# Patient Record
Sex: FEMALE | Race: WHITE | URBAN - METROPOLITAN AREA
[De-identification: names, ages, dates, MRNs, and addresses within clinical notes are randomized per-mention and may not be internally consistent; named-entity substitution may affect disease eponyms.]

---

## 2023-08-09 ENCOUNTER — APPOINTMENT (OUTPATIENT)
Dept: CT IMAGING | Facility: HOSPITAL | Age: 73
End: 2023-08-09
Attending: STUDENT IN AN ORGANIZED HEALTH CARE EDUCATION/TRAINING PROGRAM
Payer: COMMERCIAL

## 2023-08-09 ENCOUNTER — HOSPITAL ENCOUNTER (INPATIENT)
Facility: HOSPITAL | Age: 73
LOS: 1 days | Discharge: HOME HEALTH | End: 2023-08-11
Attending: STUDENT IN AN ORGANIZED HEALTH CARE EDUCATION/TRAINING PROGRAM | Admitting: INTERNAL MEDICINE
Payer: COMMERCIAL

## 2023-08-09 ENCOUNTER — APPOINTMENT (OUTPATIENT)
Dept: GENERAL RADIOLOGY | Facility: HOSPITAL | Age: 73
End: 2023-08-09
Attending: STUDENT IN AN ORGANIZED HEALTH CARE EDUCATION/TRAINING PROGRAM
Payer: COMMERCIAL

## 2023-08-09 DIAGNOSIS — I63.9 ACUTE CVA (CEREBROVASCULAR ACCIDENT) (HCC): Primary | ICD-10-CM

## 2023-08-09 LAB
ALBUMIN SERPL-MCNC: 4.2 G/DL (ref 3.4–5)
ALBUMIN/GLOB SERPL: 1.1 {RATIO} (ref 1–2)
ALP LIVER SERPL-CCNC: 80 U/L
ALT SERPL-CCNC: 20 U/L
ANION GAP SERPL CALC-SCNC: 3 MMOL/L (ref 0–18)
AST SERPL-CCNC: 14 U/L (ref 15–37)
BASOPHILS # BLD AUTO: 0.05 X10(3) UL (ref 0–0.2)
BASOPHILS NFR BLD AUTO: 0.7 %
BILIRUB SERPL-MCNC: 0.5 MG/DL (ref 0.1–2)
BUN BLD-MCNC: 33 MG/DL (ref 7–18)
CALCIUM BLD-MCNC: 9.8 MG/DL (ref 8.5–10.1)
CHLORIDE SERPL-SCNC: 108 MMOL/L (ref 98–112)
CO2 SERPL-SCNC: 27 MMOL/L (ref 21–32)
CREAT BLD-MCNC: 1.09 MG/DL
EGFRCR SERPLBLD CKD-EPI 2021: 54 ML/MIN/1.73M2 (ref 60–?)
EOSINOPHIL # BLD AUTO: 0.21 X10(3) UL (ref 0–0.7)
EOSINOPHIL NFR BLD AUTO: 2.9 %
ERYTHROCYTE [DISTWIDTH] IN BLOOD BY AUTOMATED COUNT: 11.9 %
GLOBULIN PLAS-MCNC: 3.7 G/DL (ref 2.8–4.4)
GLUCOSE BLD-MCNC: 124 MG/DL (ref 70–99)
HCT VFR BLD AUTO: 35 %
HGB BLD-MCNC: 11.7 G/DL
IMM GRANULOCYTES # BLD AUTO: 0.03 X10(3) UL (ref 0–1)
IMM GRANULOCYTES NFR BLD: 0.4 %
LYMPHOCYTES # BLD AUTO: 1.75 X10(3) UL (ref 1–4)
LYMPHOCYTES NFR BLD AUTO: 24.4 %
MCH RBC QN AUTO: 31.7 PG (ref 26–34)
MCHC RBC AUTO-ENTMCNC: 33.4 G/DL (ref 31–37)
MCV RBC AUTO: 94.9 FL
MONOCYTES # BLD AUTO: 0.56 X10(3) UL (ref 0.1–1)
MONOCYTES NFR BLD AUTO: 7.8 %
NEUTROPHILS # BLD AUTO: 4.56 X10 (3) UL (ref 1.5–7.7)
NEUTROPHILS # BLD AUTO: 4.56 X10(3) UL (ref 1.5–7.7)
NEUTROPHILS NFR BLD AUTO: 63.8 %
OSMOLALITY SERPL CALC.SUM OF ELEC: 295 MOSM/KG (ref 275–295)
PLATELET # BLD AUTO: 314 10(3)UL (ref 150–450)
POTASSIUM SERPL-SCNC: 3.8 MMOL/L (ref 3.5–5.1)
PROT SERPL-MCNC: 7.9 G/DL (ref 6.4–8.2)
RBC # BLD AUTO: 3.69 X10(6)UL
SODIUM SERPL-SCNC: 138 MMOL/L (ref 136–145)
TROPONIN I HIGH SENSITIVITY: 17 NG/L
WBC # BLD AUTO: 7.2 X10(3) UL (ref 4–11)

## 2023-08-09 PROCEDURE — 71045 X-RAY EXAM CHEST 1 VIEW: CPT | Performed by: STUDENT IN AN ORGANIZED HEALTH CARE EDUCATION/TRAINING PROGRAM

## 2023-08-09 PROCEDURE — 70450 CT HEAD/BRAIN W/O DYE: CPT | Performed by: STUDENT IN AN ORGANIZED HEALTH CARE EDUCATION/TRAINING PROGRAM

## 2023-08-10 ENCOUNTER — APPOINTMENT (OUTPATIENT)
Dept: CV DIAGNOSTICS | Facility: HOSPITAL | Age: 73
End: 2023-08-10
Attending: INTERNAL MEDICINE
Payer: COMMERCIAL

## 2023-08-10 ENCOUNTER — APPOINTMENT (OUTPATIENT)
Dept: MRI IMAGING | Facility: HOSPITAL | Age: 73
End: 2023-08-10
Attending: STUDENT IN AN ORGANIZED HEALTH CARE EDUCATION/TRAINING PROGRAM
Payer: COMMERCIAL

## 2023-08-10 PROBLEM — I63.9 ACUTE CVA (CEREBROVASCULAR ACCIDENT) (HCC): Status: ACTIVE | Noted: 2023-08-10

## 2023-08-10 PROBLEM — R73.03 PREDIABETES: Status: ACTIVE | Noted: 2023-08-10

## 2023-08-10 PROBLEM — E78.5 HYPERLIPIDEMIA: Status: ACTIVE | Noted: 2023-08-10

## 2023-08-10 LAB
ATRIAL RATE: 58 BPM
CHOLEST SERPL-MCNC: 156 MG/DL (ref ?–200)
EST. AVERAGE GLUCOSE BLD GHB EST-MCNC: 123 MG/DL (ref 68–126)
GLUCOSE BLD-MCNC: 101 MG/DL (ref 70–99)
GLUCOSE BLD-MCNC: 107 MG/DL (ref 70–99)
GLUCOSE BLD-MCNC: 107 MG/DL (ref 70–99)
GLUCOSE BLD-MCNC: 88 MG/DL (ref 70–99)
HBA1C MFR BLD: 5.9 % (ref ?–5.7)
HDLC SERPL-MCNC: 67 MG/DL (ref 40–59)
LDLC SERPL CALC-MCNC: 71 MG/DL (ref ?–100)
NONHDLC SERPL-MCNC: 89 MG/DL (ref ?–130)
P AXIS: 11 DEGREES
P-R INTERVAL: 182 MS
Q-T INTERVAL: 412 MS
QRS DURATION: 80 MS
QTC CALCULATION (BEZET): 404 MS
R AXIS: -10 DEGREES
T AXIS: 59 DEGREES
TRIGL SERPL-MCNC: 97 MG/DL (ref 30–149)
TROPONIN I HIGH SENSITIVITY: 19 NG/L
VENTRICULAR RATE: 58 BPM
VLDLC SERPL CALC-MCNC: 15 MG/DL (ref 0–30)

## 2023-08-10 PROCEDURE — 99223 1ST HOSP IP/OBS HIGH 75: CPT | Performed by: INTERNAL MEDICINE

## 2023-08-10 PROCEDURE — 70551 MRI BRAIN STEM W/O DYE: CPT | Performed by: STUDENT IN AN ORGANIZED HEALTH CARE EDUCATION/TRAINING PROGRAM

## 2023-08-10 PROCEDURE — 93306 TTE W/DOPPLER COMPLETE: CPT | Performed by: INTERNAL MEDICINE

## 2023-08-10 RX ORDER — ASPIRIN 325 MG
325 TABLET ORAL DAILY
Status: DISCONTINUED | OUTPATIENT
Start: 2023-08-10 | End: 2023-08-11

## 2023-08-10 RX ORDER — ATORVASTATIN CALCIUM 40 MG/1
40 TABLET, FILM COATED ORAL NIGHTLY
Status: DISCONTINUED | OUTPATIENT
Start: 2023-08-10 | End: 2023-08-11

## 2023-08-10 RX ORDER — DEXTROSE MONOHYDRATE 25 G/50ML
50 INJECTION, SOLUTION INTRAVENOUS
Status: DISCONTINUED | OUTPATIENT
Start: 2023-08-10 | End: 2023-08-11

## 2023-08-10 RX ORDER — HYDRALAZINE HYDROCHLORIDE 20 MG/ML
10 INJECTION INTRAMUSCULAR; INTRAVENOUS EVERY 2 HOUR PRN
Status: DISCONTINUED | OUTPATIENT
Start: 2023-08-10 | End: 2023-08-11

## 2023-08-10 RX ORDER — ACETAMINOPHEN 325 MG/1
650 TABLET ORAL EVERY 4 HOURS PRN
Status: DISCONTINUED | OUTPATIENT
Start: 2023-08-10 | End: 2023-08-11

## 2023-08-10 RX ORDER — LEVOTHYROXINE SODIUM 0.03 MG/1
25 TABLET ORAL
Status: DISCONTINUED | OUTPATIENT
Start: 2023-08-10 | End: 2023-08-11

## 2023-08-10 RX ORDER — LABETALOL HYDROCHLORIDE 5 MG/ML
10 INJECTION, SOLUTION INTRAVENOUS EVERY 10 MIN PRN
Status: DISCONTINUED | OUTPATIENT
Start: 2023-08-10 | End: 2023-08-11

## 2023-08-10 RX ORDER — LEVOTHYROXINE SODIUM 0.03 MG/1
25 TABLET ORAL
COMMUNITY

## 2023-08-10 RX ORDER — ENOXAPARIN SODIUM 100 MG/ML
40 INJECTION SUBCUTANEOUS DAILY
Status: DISCONTINUED | OUTPATIENT
Start: 2023-08-10 | End: 2023-08-11

## 2023-08-10 RX ORDER — GABAPENTIN 300 MG/1
300 CAPSULE ORAL NIGHTLY
Status: DISCONTINUED | OUTPATIENT
Start: 2023-08-10 | End: 2023-08-11

## 2023-08-10 RX ORDER — METOCLOPRAMIDE HYDROCHLORIDE 5 MG/ML
10 INJECTION INTRAMUSCULAR; INTRAVENOUS EVERY 8 HOURS PRN
Status: DISCONTINUED | OUTPATIENT
Start: 2023-08-10 | End: 2023-08-11

## 2023-08-10 RX ORDER — VALSARTAN AND HYDROCHLOROTHIAZIDE 80; 12.5 MG/1; MG/1
1 TABLET, FILM COATED ORAL DAILY
COMMUNITY

## 2023-08-10 RX ORDER — NAPROXEN 500 MG/1
500 TABLET ORAL 2 TIMES DAILY WITH MEALS
COMMUNITY
End: 2023-08-11

## 2023-08-10 RX ORDER — NICOTINE POLACRILEX 4 MG
30 LOZENGE BUCCAL
Status: DISCONTINUED | OUTPATIENT
Start: 2023-08-10 | End: 2023-08-11

## 2023-08-10 RX ORDER — ACETAMINOPHEN 650 MG/1
650 SUPPOSITORY RECTAL EVERY 4 HOURS PRN
Status: DISCONTINUED | OUTPATIENT
Start: 2023-08-10 | End: 2023-08-11

## 2023-08-10 RX ORDER — ONDANSETRON 2 MG/ML
4 INJECTION INTRAMUSCULAR; INTRAVENOUS EVERY 6 HOURS PRN
Status: DISCONTINUED | OUTPATIENT
Start: 2023-08-10 | End: 2023-08-11

## 2023-08-10 RX ORDER — NICOTINE POLACRILEX 4 MG
15 LOZENGE BUCCAL
Status: DISCONTINUED | OUTPATIENT
Start: 2023-08-10 | End: 2023-08-11

## 2023-08-10 RX ORDER — POTASSIUM CHLORIDE 20 MEQ/1
40 TABLET, EXTENDED RELEASE ORAL ONCE
Status: COMPLETED | OUTPATIENT
Start: 2023-08-10 | End: 2023-08-10

## 2023-08-10 RX ORDER — SODIUM CHLORIDE 9 MG/ML
INJECTION, SOLUTION INTRAVENOUS CONTINUOUS
Status: DISCONTINUED | OUTPATIENT
Start: 2023-08-10 | End: 2023-08-11

## 2023-08-10 RX ORDER — ASPIRIN 300 MG/1
300 SUPPOSITORY RECTAL DAILY
Status: DISCONTINUED | OUTPATIENT
Start: 2023-08-10 | End: 2023-08-11

## 2023-08-10 NOTE — PROGRESS NOTES
Assumed care 1700  Patient alert and oriented X4, [place] speaking  On room air, VSS, NSR/SB on tele   Q4 neuro, no new deficits   Up with SBA and cane  Updated on plan of care

## 2023-08-10 NOTE — ED INITIAL ASSESSMENT (HPI)
Pt family reports pt having R sided weakness since 7/30. Seen by PCP today, advised to come for Ataxia and R/O CVA. Pt primary language, [place]. Son here with pt.      No facial droop, slight weakness to the R arm and R leg, unable to assess speech due to language barrier

## 2023-08-10 NOTE — SLP NOTE
ADULT SWALLOWING EVALUATION    ASSESSMENT    ASSESSMENT/OVERALL IMPRESSION:  Order received for bedside swallow evaluation to r/o aspiration per CVA protocol. Pt presented to THE [facility] ER from primary physician's office for CVA workup. Pt with c/o on-going HA, R hemiparesis and gait instability. MRI pending. Pmhx includes CVA, HTN, CAD w/stenting, HLD and DM. Pt's primary language is [place]. Pt passed RN dysphagia screening and placed on a regular consistency diet with thin liquids. Pt found lying down in bed; alert & participatory; able to follow all commands and participate in conversation appropriately with utilization of [facility] . Pt reported intermittent throat pain but denied any significant hx of dysphagia. She reported good tolerance with regular consistency diet and thin liquids prior to and currently while admitted. Oral mech/motor exam revealed patient with upper and lower partial dentures. Oral cavity appeared clean & moist. No significant oral motor deficits discovered. Clear vocal quality, volitional swallow and productive cough elicited. Head of bed elevated to 90 degrees and pt observed feeding herself po trials of thin via cup & straw, pureed and cracker consistencies. Adequate retrieval & containment with timely mastication & transit; no observed oral residue. Pharyngeal response appeared timely with hyolaryngeal elevation palpated and clear vocal quality following all po trials. No overt clinical s/s of aspiration noted or reported by patient or staff. Swallow mechanism appears to be grossly intact with no overt clinical s/s of aspiration noted or reported. Following exam, discussed results, aspiration risks, diet recommendations, aspiration precautions and plan with patient. Answered pt's questions to her apparent satisfaction with good understanding reported residual deficits in communication from previous CVA in 2015 with some increased difficulty with \"words dragging. \" Will continue to follow to assess tolerance with recommended diet and complete cog-communicative exam pending neuro workup. Shrestha Assessment of Swallow Function Score: No abnormality detected    RECOMMENDATIONS   Diet Recommendations - Solids: Regular  Diet Recommendations - Liquids: Thin Liquids                        Compensatory Strategies Recommended: Slow rate; Alternate consistencies;Small bites and sips  Aspiration Precautions: Upright position; Slow rate;Small bites and sips  Medication Administration Recommendations: No restrictions  Treatment Plan/Recommendations: Dysphagia therapy; Aspiration precautions (communication evaluation pending neuro workup)  Discharge Recommendations/Plan: Undetermined    HISTORY   MEDICAL HISTORY  Reason for Referral: Stroke protocol    Problem List  Principal Problem:    Acute CVA (cerebrovascular accident) [facility])  Active Problems:    Essential hypertension    Hyperlipidemia    Prediabetes      Past Medical History  Past Medical History:   Diagnosis Date    Atherosclerosis of coronary artery     CVA (cerebral vascular accident) ([facility] [address] 2015    Diabetes ([facility] [address]     Hyperlipidemia     Unspecified essential hypertension        Prior Living Situation: Home with support (lives with sons)  Diet Prior to Admission: Regular; Thin liquids       Patient/Family Goals: return home    SWALLOWING HISTORY  Current Diet Consistency: Regular; Thin liquids  Dysphagia History: denied  Imaging Results: CXR 8/9/23  Unremarkable     SUBJECTIVE       OBJECTIVE   ORAL MOTOR EXAMINATION  Dentition: Upper partials; Lower partials  Symmetry: Within Functional Limits  Strength: Within Functional Limits  Tone: Within Functional Limits  Range of Motion: Within Functional Limits  Rate of Motion: Within Functional Limits    Voice Quality: Clear  Respiratory Status: Unlabored  Consistencies Trialed: Thin liquids;Puree;Hard solid  Method of Presentation: Self presentation;Spoon;Cup;Straw;Single sips; Consecutive swallows  Patient Positioning: Upright;Midline (pt lying down in bed; HOB elevated to 90 degrees)    Oral Phase of Swallow: Within Functional Limits                      Pharyngeal Phase of Swallow:  (appears to be [facility] - no overt clinical s/s of aspiration)           (Please note: Silent aspiration cannot be evaluated clinically. Videofluoroscopic Swallow Study is required to rule-out silent aspiration.)    Esophageal Phase of Swallow: No complaints consistent with possible esophageal involvement  Comments:               GOALS  Goal #1 The patient will tolerate regular consistency and thin liquids without overt signs or symptoms of aspiration with 95 % accuracy over 2 session(s). In Progress   Goal #2 The patient/family/caregiver will demonstrate understanding and implementation of aspiration precautions and swallow strategies independently over 2 session(s). In progress   Goal #3 The patient will utilize compensatory strategies as outlined by  BSSE (clinical evaluation) including Slow rate, Small bites, Small sips, Alternate liquids/solids, Upright 90 degrees with as needed assistance 100 % of the time across 2 sessions. In progress                              FOLLOW UP  Treatment Plan/Recommendations: Dysphagia therapy; Aspiration precautions (communication evaluation pending neuro workup)  Number of Visits to Meet Established Goals: 2  Follow Up Needed (Documentation Required): Yes  SLP Follow-up Date: 08/11/23    Thank you for your referral.   If you have any questions, please contact Bart Rios, [place], [address] [facility]  Pager

## 2023-08-10 NOTE — PHYSICAL THERAPY NOTE
PHYSICAL THERAPY EVALUATION - INPATIENT     Room Number: 4038/0103-V  Evaluation Date: 8/10/2023  Type of Evaluation: Initial  Physician Order: PT Eval and Treat    Presenting Problem: R side weakness with ataxia  Co-Morbidities : Hx CVA, HTN, CAD s/p stenting, DM2  Reason for Therapy: Mobility Dysfunction and Discharge Planning    CT BRAIN:  No evidence of intracranial hemorrhage or extra-axial fluid collection. Lucencies in the deep periventricular white matter are likely sequelae of chronic small vessel ischemic disease. Prominence of the sulci. No mass effect. Visualized portions of paranasal sinuses are unremarkable. Visualized portions of the mastoid air cells are unremarkable. Visualized portions of the orbits are unremarkable. IMPRESSION:   Mild global parenchymal volume loss. Sequelae of chronic small vessel ischemic disease is noted. No evidence of intracranial hemorrhage or extra-axial fluid collection. MRI BRAIN: There is no evidence of acute infarct. ASSESSMENT   Pt is a 68year old female admitted on 8/9/2023 for R side weakness with gait abnormalities and ataxia- imaging with no acute findings. Functional outcome measures completed include [facility]. The AM-PAC '6-Clicks' Inpatient Basic Mobility Short Form was completed and this patient is demonstrating a Approx Degree of Impairment: 41.77%  degree of impairment in mobility. Research supports that patients with this level of impairment may benefit from HOME w/ int supervision. PT Discharge Recommendations: Home; Intermittent Supervision      PLAN  Patient has been evaluated and presents with no skilled Physical Therapy needs at this time. Patient discharged from Physical Therapy services. Please re-order if a new functional limitation presents during this admission. GOALS  Patient was able to achieve the following goals . ..     Patient was able to transfer At previous, functional level  Safely and independently   Patient able to ambulate on level surfaces At previous, functional level  Safely and independently         HOME SITUATION  Type of Home: House   Home Layout: Two level  Stairs to Enter : 2     Stairs to Bedroom: 13  Railing: Yes    Lives With: Son;Family  Drives: No  Patient Owned Equipment: Cane  Patient Regularly Uses: Glasses    Prior Level of [place]:  used as pt is [place] speaking- typically [place] with mobility tasks. Ambulates with cane. Lives in two story home with son and his family. Daughter in law assists with IADLs. Denies history of falls. She states she had an \"episode\" on July 30th, where she was unable to walk for 3 days and had significant weakness in RUE/RLE, but she notes a lot of it has seemed to resolved. Has chronic R knee pain as well. Does make note that she only does the stairs once a day; stays on main level throughout the day and only goes up to go to bed. SUBJECTIVE  \"SUPER!:      OBJECTIVE  Precautions: Bed/chair alarm;  needed ([place] speaking)  Fall Risk: High fall risk    WEIGHT BEARING RESTRICTION  Weight Bearing Restriction: None                PAIN ASSESSMENT  Rating: Unable to rate  Location: r knee  Management Techniques: Activity promotion; Body mechanics;Repositioning    COGNITION  Overall Cognitive Status:  WFL - within functional limits    RANGE OF MOTION AND STRENGTH ASSESSMENT  Upper extremity ROM and strength are within functional limits     Lower extremity ROM is within functional limits     Lower extremity strength is within functional limits : unable to fully test R LE due to pain in the knee- observed pt preferring to ambulate with little to no knee flexion       BALANCE  Static Sitting: Good  Dynamic Sitting: Fair +  Static Standing: Fair  Dynamic Standing: Fair -    ADDITIONAL TESTS                 Modified [place]: 1                  ACTIVITY TOLERANCE                         O2 WALK       NEUROLOGICAL FINDINGS                        AM-PAC '6-Clicks' INPATIENT SHORT FORM - BASIC MOBILITY  How much difficulty does the patient currently have. .. Patient Difficulty: Turning over in bed (including adjusting bedclothes, sheets and blankets)?: None   Patient Difficulty: Sitting down on and standing up from a chair with arms (e.g., wheelchair, bedside commode, etc.): A Little   Patient Difficulty: Moving from lying on back to sitting on the side of the bed?: A Little   How much help from another person does the patient currently need. .. Help from Another: Moving to and from a bed to a chair (including a wheelchair)?: A Little   Help from Another: Need to walk in hospital room?: A Little   Help from Another: Climbing 3-5 steps with a railing?: A Little       AM-PAC Score:  Raw Score: 19   Approx Degree of Impairment: 41.77%   Standardized Score (AM-PAC Scale): 45.44   CMS Modifier (G-Code): CK    FUNCTIONAL ABILITY STATUS  Gait Assessment   Functional Mobility/Gait Assessment  Gait Assistance: Contact guard assist  Distance (ft): 100  Assistive Device: Cane  Pattern:  (antalgic)    Skilled Therapy Provided     Bed Mobility:  Rolling: ind  Supine to sit: ind   Sit to supine: ind     Transfer Mobility:  Sit to stand: supervision to cane   Stand to sit: supervision  Gait = CGA/supervision with cane. Antalgic gait pattern due to chronic R knee pain. Prefers to walk with RLE in extension and circumduction for clearance. Therapist's comments:Patient presents in bed.  used as pt is [place] speaking. Pt completed bed mobility independently. Once sitting EOB, pt reporting dizziness- BP within parameters. Coordination observed to be intact. No significant strength deficits noted; unable to fully assess RLE as pt yelping out in pain/discomfort when attempted to apply any over pressure. Sit to stand with cane at SBA. Ambulated with cane at CGA/ SBA, note gait pattern above.  Educated pt on importance of safety with walking and slowing ambulation down, possibly recommend use of RW for inr safety and stability, but pt wanting to use cane. Pt returned supine in bed independently at end of session, as echo tech present to perform test. Pt seems to be functioning at her baseline and does not require any further IP PT interventions. Upon discharge, recommendation to home with no therapy needs. She has good family support. RN, PCT, and SW aware. Exercise/Education Provided:  Bed mobility  Body mechanics  Energy conservation  Functional activity tolerated  Gait training  Transfer training    Patient End of Session: In bed;Needs met;Call light within reach;RN aware of session/findings; All patient questions and concerns addressed; Alarm set; Discussed recommendations with /    Patient Evaluation Complexity Level:  History Moderate - 1 or 2 personal factors and/or co-morbidities   Examination of body systems Low - addressing 1-2 elements   Clinical Presentation Low - Stable   Clinical Decision Making Low Complexity       PT Session Time: 17 minutes  Gait Training: 10 minutes  Therapeutic Activity: minutes  Neuromuscular Re-education: minutes  Therapeutic Exercise:  minutes

## 2023-08-10 NOTE — PROGRESS NOTES
Assumed care of pt at 0730. AxO x4, primarily [place] speaking, utilized language line for all patient interactions. Neuro checks q4 hrs, right sided weakness with improving dizziness and diminished right sided sensation throughout the shift, see flowsheets. C/o right leg pain, usually controlled with gabapentin at home per patient, gabapentin ordered to start tonight, currently tolerable with tylenol. RA. NSR/SB on tele, lovenox. MRI/MRA ordered, screening form completed. Voids. Appetite okay, some nausea this afternoon, zofran with relief. Up with SBA with cane, bed alarm active. POC reviewed, call light within reach.

## 2023-08-10 NOTE — PLAN OF CARE
NURSING ADMISSION NOTE      Patient admitted via Cart to 7621  Oriented to room. Safety precautions initiated. Bed in low position. Call light in reach. Received pt at 0300  Pt AOx4, NSR/SB, RA, VSS  Pt is [place] speaking only.  used to converse with pt. Still noncompliant with fall precautions   Admission navigator completed w/ son, Bienvenido Carson. Neuro checks. See flowsheets  IVF  D/c Planning: Neuro, PT/OT to see. MRI/MRA head/ neck, echo  Call light within reach.   Needs currently met

## 2023-08-10 NOTE — ED QUICK NOTES
Orders for admission, patient is aware of plan and ready to go upstairs. Any questions, please call ED RN Bianka at extension [phone]. Patient Covid vaccination status: Unvaccinated     COVID Test Ordered in ED: None    COVID Suspicion at Admission: N/A    Running Infusions:  None    Mental Status/LOC at time of transport: A&Ox4    Other pertinent information: Patient is [place] speaking only. Son has went home form the night. Pt was using purewick as precaution but is ambulatory.   CIWA score: N/A   NIH score:  4

## 2023-08-11 ENCOUNTER — APPOINTMENT (OUTPATIENT)
Dept: MRI IMAGING | Facility: HOSPITAL | Age: 73
End: 2023-08-11
Attending: INTERNAL MEDICINE
Payer: COMMERCIAL

## 2023-08-11 VITALS
DIASTOLIC BLOOD PRESSURE: 47 MMHG | HEART RATE: 61 BPM | WEIGHT: 165 LBS | TEMPERATURE: 97 F | RESPIRATION RATE: 17 BRPM | BODY MASS INDEX: 35.11 KG/M2 | HEIGHT: 57.48 IN | SYSTOLIC BLOOD PRESSURE: 116 MMHG | OXYGEN SATURATION: 97 %

## 2023-08-11 LAB
ANION GAP SERPL CALC-SCNC: 3 MMOL/L (ref 0–18)
BASOPHILS # BLD AUTO: 0.07 X10(3) UL (ref 0–0.2)
BASOPHILS NFR BLD AUTO: 1.3 %
BUN BLD-MCNC: 24 MG/DL (ref 7–18)
CALCIUM BLD-MCNC: 9.1 MG/DL (ref 8.5–10.1)
CHLORIDE SERPL-SCNC: 113 MMOL/L (ref 98–112)
CO2 SERPL-SCNC: 23 MMOL/L (ref 21–32)
CREAT BLD-MCNC: 1.07 MG/DL
EGFRCR SERPLBLD CKD-EPI 2021: 55 ML/MIN/1.73M2 (ref 60–?)
EOSINOPHIL # BLD AUTO: 0.41 X10(3) UL (ref 0–0.7)
EOSINOPHIL NFR BLD AUTO: 7.5 %
ERYTHROCYTE [DISTWIDTH] IN BLOOD BY AUTOMATED COUNT: 12.1 %
GLUCOSE BLD-MCNC: 104 MG/DL (ref 70–99)
GLUCOSE BLD-MCNC: 85 MG/DL (ref 70–99)
GLUCOSE BLD-MCNC: 87 MG/DL (ref 70–99)
GLUCOSE BLD-MCNC: 99 MG/DL (ref 70–99)
HCT VFR BLD AUTO: 32.6 %
HGB BLD-MCNC: 10.2 G/DL
IMM GRANULOCYTES # BLD AUTO: 0.01 X10(3) UL (ref 0–1)
IMM GRANULOCYTES NFR BLD: 0.2 %
LYMPHOCYTES # BLD AUTO: 2.25 X10(3) UL (ref 1–4)
LYMPHOCYTES NFR BLD AUTO: 41.1 %
MCH RBC QN AUTO: 31.3 PG (ref 26–34)
MCHC RBC AUTO-ENTMCNC: 31.3 G/DL (ref 31–37)
MCV RBC AUTO: 100 FL
MONOCYTES # BLD AUTO: 0.55 X10(3) UL (ref 0.1–1)
MONOCYTES NFR BLD AUTO: 10.1 %
NEUTROPHILS # BLD AUTO: 2.18 X10 (3) UL (ref 1.5–7.7)
NEUTROPHILS # BLD AUTO: 2.18 X10(3) UL (ref 1.5–7.7)
NEUTROPHILS NFR BLD AUTO: 39.8 %
OSMOLALITY SERPL CALC.SUM OF ELEC: 291 MOSM/KG (ref 275–295)
PLATELET # BLD AUTO: 250 10(3)UL (ref 150–450)
POTASSIUM SERPL-SCNC: 4.3 MMOL/L (ref 3.5–5.1)
POTASSIUM SERPL-SCNC: 4.3 MMOL/L (ref 3.5–5.1)
RBC # BLD AUTO: 3.26 X10(6)UL
SODIUM SERPL-SCNC: 139 MMOL/L (ref 136–145)
WBC # BLD AUTO: 5.5 X10(3) UL (ref 4–11)

## 2023-08-11 PROCEDURE — 70551 MRI BRAIN STEM W/O DYE: CPT | Performed by: INTERNAL MEDICINE

## 2023-08-11 PROCEDURE — 99239 HOSP IP/OBS DSCHRG MGMT >30: CPT | Performed by: INTERNAL MEDICINE

## 2023-08-11 PROCEDURE — 70547 MR ANGIOGRAPHY NECK W/O DYE: CPT | Performed by: INTERNAL MEDICINE

## 2023-08-11 PROCEDURE — 70544 MR ANGIOGRAPHY HEAD W/O DYE: CPT | Performed by: INTERNAL MEDICINE

## 2023-08-11 RX ORDER — CLOPIDOGREL BISULFATE 75 MG/1
75 TABLET ORAL DAILY
Status: DISCONTINUED | OUTPATIENT
Start: 2023-08-11 | End: 2023-08-11

## 2023-08-11 RX ORDER — CLOPIDOGREL BISULFATE 75 MG/1
75 TABLET ORAL DAILY
Qty: 30 TABLET | Refills: 2 | Status: SHIPPED | OUTPATIENT
Start: 2023-08-11

## 2023-08-11 RX ORDER — ATORVASTATIN CALCIUM 40 MG/1
40 TABLET, FILM COATED ORAL NIGHTLY
Qty: 90 TABLET | Refills: 1 | Status: SHIPPED | OUTPATIENT
Start: 2023-08-11

## 2023-08-11 RX ORDER — ASPIRIN 81 MG/1
81 TABLET ORAL DAILY
Qty: 20 TABLET | Refills: 0 | Status: SHIPPED | OUTPATIENT
Start: 2023-08-12

## 2023-08-11 NOTE — PLAN OF CARE
Assumed care at [address].    A&Ox4, RA, SB on tele  Q4 Neuro, no new deficits  No complaints of pain  Voiding up in bathroom  IVF infusing per order  Call light within reach    Patient updated on plan of care

## 2023-08-11 NOTE — DISCHARGE INSTRUCTIONS
We did not see any new strokes but we want to change you medications for better stroke prevention. Change aspirin to 81mg daily for 20 days starting 8/12/21. Stop taking aspirin after all the pills are gone  Start taking Clopidogrel 75mg daily. Follow up with Neurology in 2 months.

## 2023-08-11 NOTE — SLP NOTE
Attempted to see for follow up however patient currently out of room for testing. Will re-attempt as available and appropriate.      Cony Mcintosh [place], [address] [facility]  Pager

## 2023-08-11 NOTE — PAYOR COMM NOTE
Discharge Notification    Patient Name: Jayla Sheikher: Isa Moseleyinz POS/EMERY  Subscriber #: [id number]  Authorization Number: Norville Fothergill Date/Time: 8/9/2023 9:54 PM  Discharge Date/Time:

## 2023-08-11 NOTE — PLAN OF CARE
NURSING DISCHARGE NOTE    Discharged Home via Wheelchair. Accompanied by Family member and Support staff  Belongings Taken by patient/family. Assumed care @2252  VSS,   A&Ox4, neuro's q4 without new deficits. RA    NSR ,   Chronic pain in R knee-PRN Tylenol given with little relief. Up with standby assist and cane as tolerated. Tolerating cardiac carb controlled diet. Intake and outputs w/d/l.    PT/OT recommend home with intermittent supervision. IVF infusing [employer]"    Discharge navigator complete. Discussed medications and upcoming appointments with patient and son. All questions answered. Problem: NEUROLOGICAL - ADULT  Goal: Achieves stable or improved neurological status  Description: INTERVENTIONS  - Assess for and report changes in neurological status  - Initiate measures to prevent increased intracranial pressure  - Maintain blood pressure and fluid volume within ordered parameters to optimize cerebral perfusion and minimize risk of hemorrhage  - Monitor temperature, glucose, and sodium.  Initiate appropriate interventions as ordered  Outcome: Adequate for Discharge  Goal: Achieves maximal functionality and self care  Description: INTERVENTIONS  - Monitor swallowing and airway patency with patient fatigue and changes in neurological status  - Encourage and assist patient to increase activity and self care with guidance from PT/OT  - Encourage visually impaired, hearing impaired and aphasic patients to use assistive/communication devices  Outcome: Adequate for Discharge     Problem: Diabetes/Glucose Control  Goal: Glucose maintained within prescribed range  Description: INTERVENTIONS:  - Monitor Blood Glucose as ordered  - Assess for signs and symptoms of hyperglycemia and hypoglycemia  - Administer ordered medications to maintain glucose within target range  - Assess barriers to adequate nutritional intake and initiate nutrition consult as needed  - Instruct patient on self management of diabetes  Outcome: Adequate for Discharge

## 2023-08-14 ENCOUNTER — PATIENT OUTREACH (OUTPATIENT)
Dept: CASE MANAGEMENT | Age: 73
End: 2023-08-14

## 2023-08-15 NOTE — PROGRESS NOTES
PEGGYDONNIE for post hospital follow up. [facility] contact information provided as well as [facility] office number, [phone].

## 2023-08-16 ENCOUNTER — PATIENT OUTREACH (OUTPATIENT)
Dept: CASE MANAGEMENT | Age: 73
End: 2023-08-16

## 2023-08-16 NOTE — PROGRESS NOTES
Hospital follow up, first attempt. (Dc 8/11)    [facility]  Specialty: Internal Medicine  [address] [address] [phone]    Jayden Contreras MD  Specialty: NEUROSURGERY  meningioma  [place] [facility]  [address] [facility] [phone]  2 weeks    Maira Elena Ibarra MD  Specialty: [address]  [address] [address]  [phone]  2 months    No answer, left a voicemail with callback information.

## 2023-08-17 NOTE — PROGRESS NOTES
2nd attempt Neuro apt request    Lety Padilla MD  Specialty: [address]  [address] [address]  [phone]    Unable to contact patient. LMTCB

## 2023-08-18 NOTE — PROGRESS NOTES
Hospital follow up, final attempt. (Dc 8/11)    [facility]  Specialty: Internal Medicine  [address] [address] [phone]    Jack Beach MD  Specialty: NEUROSURGERY  meningioma  [place] [facility]  [address] [facility] [phone]  2 weeks    Radha Lal MD  Specialty: [address]  [address] [address] [phone]  2 months    No answer, left a voicemail with callback information. Closing encounter.

## 2024-07-06 ENCOUNTER — LABORATORY ENCOUNTER (OUTPATIENT)
Dept: LAB | Facility: HOSPITAL | Age: 74
End: 2024-07-06
Attending: ORTHOPAEDIC SURGERY
Payer: COMMERCIAL

## 2024-07-06 DIAGNOSIS — Z01.818 PRE-OP TESTING: ICD-10-CM

## 2024-07-06 LAB
ANION GAP SERPL CALC-SCNC: 8 MMOL/L (ref 0–18)
ANTIBODY SCREEN: NEGATIVE
ATRIAL RATE: 61 BPM
BUN BLD-MCNC: 33 MG/DL (ref 9–23)
CALCIUM BLD-MCNC: 9.8 MG/DL (ref 8.5–10.1)
CHLORIDE SERPL-SCNC: 107 MMOL/L (ref 98–112)
CO2 SERPL-SCNC: 23 MMOL/L (ref 21–32)
CREAT BLD-MCNC: 1.14 MG/DL
EGFRCR SERPLBLD CKD-EPI 2021: 51 ML/MIN/1.73M2 (ref 60–?)
ERYTHROCYTE [DISTWIDTH] IN BLOOD BY AUTOMATED COUNT: 12.8 %
FASTING STATUS PATIENT QL REPORTED: YES
GLUCOSE BLD-MCNC: 92 MG/DL (ref 70–99)
HCT VFR BLD AUTO: 29.2 %
HGB BLD-MCNC: 9.9 G/DL
MCH RBC QN AUTO: 31.9 PG (ref 26–34)
MCHC RBC AUTO-ENTMCNC: 33.9 G/DL (ref 31–37)
MCV RBC AUTO: 94.2 FL
OSMOLALITY SERPL CALC.SUM OF ELEC: 293 MOSM/KG (ref 275–295)
P AXIS: 26 DEGREES
P-R INTERVAL: 172 MS
PLATELET # BLD AUTO: 237 10(3)UL (ref 150–450)
POTASSIUM SERPL-SCNC: 4.3 MMOL/L (ref 3.5–5.1)
Q-T INTERVAL: 402 MS
QRS DURATION: 82 MS
QTC CALCULATION (BEZET): 404 MS
R AXIS: 7 DEGREES
RBC # BLD AUTO: 3.1 X10(6)UL
RH BLOOD TYPE: POSITIVE
SODIUM SERPL-SCNC: 138 MMOL/L (ref 136–145)
T AXIS: 62 DEGREES
VENTRICULAR RATE: 61 BPM
WBC # BLD AUTO: 5.4 X10(3) UL (ref 4–11)

## 2024-07-06 PROCEDURE — 86901 BLOOD TYPING SEROLOGIC RH(D): CPT

## 2024-07-06 PROCEDURE — 80048 BASIC METABOLIC PNL TOTAL CA: CPT

## 2024-07-06 PROCEDURE — 87081 CULTURE SCREEN ONLY: CPT

## 2024-07-06 PROCEDURE — 93005 ELECTROCARDIOGRAM TRACING: CPT

## 2024-07-06 PROCEDURE — 85027 COMPLETE CBC AUTOMATED: CPT

## 2024-07-06 PROCEDURE — 86900 BLOOD TYPING SEROLOGIC ABO: CPT

## 2024-07-06 PROCEDURE — 36415 COLL VENOUS BLD VENIPUNCTURE: CPT

## 2024-07-06 PROCEDURE — 93010 ELECTROCARDIOGRAM REPORT: CPT | Performed by: INTERNAL MEDICINE

## 2024-07-06 PROCEDURE — 86850 RBC ANTIBODY SCREEN: CPT

## 2024-07-25 ENCOUNTER — HOSPITAL ENCOUNTER (OUTPATIENT)
Facility: HOSPITAL | Age: 74
Discharge: HOME HEALTH | End: 2024-07-26
Attending: ORTHOPAEDIC SURGERY | Admitting: ORTHOPAEDIC SURGERY
Payer: COMMERCIAL

## 2024-07-25 ENCOUNTER — ANESTHESIA (OUTPATIENT)
Dept: SURGERY | Facility: HOSPITAL | Age: 74
End: 2024-07-25
Payer: COMMERCIAL

## 2024-07-25 ENCOUNTER — ANESTHESIA EVENT (OUTPATIENT)
Dept: SURGERY | Facility: HOSPITAL | Age: 74
End: 2024-07-25
Payer: COMMERCIAL

## 2024-07-25 ENCOUNTER — APPOINTMENT (OUTPATIENT)
Dept: GENERAL RADIOLOGY | Facility: HOSPITAL | Age: 74
End: 2024-07-25
Attending: ORTHOPAEDIC SURGERY
Payer: COMMERCIAL

## 2024-07-25 DIAGNOSIS — Z01.818 PRE-OP TESTING: Primary | ICD-10-CM

## 2024-07-25 LAB
GLUCOSE BLD-MCNC: 113 MG/DL (ref 70–99)
GLUCOSE BLD-MCNC: 230 MG/DL (ref 70–99)
GLUCOSE BLD-MCNC: 231 MG/DL (ref 70–99)
GLUCOSE BLD-MCNC: 99 MG/DL (ref 70–99)
RH BLOOD TYPE: POSITIVE

## 2024-07-25 PROCEDURE — 0SRC0J9 REPLACEMENT OF RIGHT KNEE JOINT WITH SYNTHETIC SUBSTITUTE, CEMENTED, OPEN APPROACH: ICD-10-PCS | Performed by: ORTHOPAEDIC SURGERY

## 2024-07-25 PROCEDURE — 73560 X-RAY EXAM OF KNEE 1 OR 2: CPT | Performed by: ORTHOPAEDIC SURGERY

## 2024-07-25 PROCEDURE — 88305 TISSUE EXAM BY PATHOLOGIST: CPT | Performed by: ORTHOPAEDIC SURGERY

## 2024-07-25 PROCEDURE — 76942 ECHO GUIDE FOR BIOPSY: CPT | Performed by: ANESTHESIOLOGY

## 2024-07-25 PROCEDURE — 82962 GLUCOSE BLOOD TEST: CPT

## 2024-07-25 PROCEDURE — 97530 THERAPEUTIC ACTIVITIES: CPT

## 2024-07-25 PROCEDURE — 88311 DECALCIFY TISSUE: CPT | Performed by: ORTHOPAEDIC SURGERY

## 2024-07-25 PROCEDURE — 97161 PT EVAL LOW COMPLEX 20 MIN: CPT

## 2024-07-25 DEVICE — IMPLANTABLE DEVICE
Type: IMPLANTABLE DEVICE | Site: KNEE | Status: FUNCTIONAL
Brand: PERSONA® NATURAL TIBIA®

## 2024-07-25 DEVICE — IMPLANTABLE DEVICE
Type: IMPLANTABLE DEVICE | Site: KNEE | Status: FUNCTIONAL
Brand: REFOBACIN® BONE CEMENT R

## 2024-07-25 DEVICE — IMPLANTABLE DEVICE
Type: IMPLANTABLE DEVICE | Site: KNEE | Status: FUNCTIONAL
Brand: PERSONA®

## 2024-07-25 DEVICE — IMPLANTABLE DEVICE
Type: IMPLANTABLE DEVICE | Site: KNEE | Status: FUNCTIONAL
Brand: PERSONA® VIVACIT-E®

## 2024-07-25 RX ORDER — POLYETHYLENE GLYCOL 3350 17 G/17G
17 POWDER, FOR SOLUTION ORAL DAILY PRN
Status: DISCONTINUED | OUTPATIENT
Start: 2024-07-25 | End: 2024-07-26

## 2024-07-25 RX ORDER — ROPIVACAINE HYDROCHLORIDE 5 MG/ML
INJECTION, SOLUTION EPIDURAL; INFILTRATION; PERINEURAL AS NEEDED
Status: DISCONTINUED | OUTPATIENT
Start: 2024-07-25 | End: 2024-07-25 | Stop reason: SURG

## 2024-07-25 RX ORDER — ACETAMINOPHEN 325 MG/1
650 TABLET ORAL EVERY 4 HOURS
COMMUNITY
Start: 2024-07-19 | End: 2024-08-02

## 2024-07-25 RX ORDER — DOCUSATE SODIUM 100 MG/1
100 CAPSULE, LIQUID FILLED ORAL 2 TIMES DAILY
Status: DISCONTINUED | OUTPATIENT
Start: 2024-07-25 | End: 2024-07-26

## 2024-07-25 RX ORDER — INSULIN ASPART 100 [IU]/ML
INJECTION, SOLUTION INTRAVENOUS; SUBCUTANEOUS ONCE
Status: DISCONTINUED | OUTPATIENT
Start: 2024-07-25 | End: 2024-07-25 | Stop reason: HOSPADM

## 2024-07-25 RX ORDER — METOCLOPRAMIDE HYDROCHLORIDE 5 MG/ML
10 INJECTION INTRAMUSCULAR; INTRAVENOUS EVERY 8 HOURS PRN
Status: DISCONTINUED | OUTPATIENT
Start: 2024-07-25 | End: 2024-07-25 | Stop reason: HOSPADM

## 2024-07-25 RX ORDER — DEXAMETHASONE SODIUM PHOSPHATE 4 MG/ML
VIAL (ML) INJECTION AS NEEDED
Status: DISCONTINUED | OUTPATIENT
Start: 2024-07-25 | End: 2024-07-25 | Stop reason: SURG

## 2024-07-25 RX ORDER — FAMOTIDINE 20 MG/1
20 TABLET, FILM COATED ORAL 2 TIMES DAILY
Status: DISCONTINUED | OUTPATIENT
Start: 2024-07-25 | End: 2024-07-26

## 2024-07-25 RX ORDER — HYDROCODONE BITARTRATE AND ACETAMINOPHEN 5; 325 MG/1; MG/1
1 TABLET ORAL ONCE AS NEEDED
Status: DISCONTINUED | OUTPATIENT
Start: 2024-07-25 | End: 2024-07-25 | Stop reason: HOSPADM

## 2024-07-25 RX ORDER — MEPERIDINE HYDROCHLORIDE 25 MG/ML
12.5 INJECTION INTRAMUSCULAR; INTRAVENOUS; SUBCUTANEOUS AS NEEDED
Status: DISCONTINUED | OUTPATIENT
Start: 2024-07-25 | End: 2024-07-25 | Stop reason: HOSPADM

## 2024-07-25 RX ORDER — ENEMA 19; 7 G/133ML; G/133ML
1 ENEMA RECTAL ONCE AS NEEDED
Status: DISCONTINUED | OUTPATIENT
Start: 2024-07-25 | End: 2024-07-26

## 2024-07-25 RX ORDER — NICOTINE POLACRILEX 4 MG
30 LOZENGE BUCCAL
Status: DISCONTINUED | OUTPATIENT
Start: 2024-07-25 | End: 2024-07-25 | Stop reason: HOSPADM

## 2024-07-25 RX ORDER — ONDANSETRON 2 MG/ML
4 INJECTION INTRAMUSCULAR; INTRAVENOUS EVERY 6 HOURS PRN
Status: DISCONTINUED | OUTPATIENT
Start: 2024-07-25 | End: 2024-07-25 | Stop reason: HOSPADM

## 2024-07-25 RX ORDER — FAMOTIDINE 10 MG/ML
20 INJECTION, SOLUTION INTRAVENOUS 2 TIMES DAILY
Status: DISCONTINUED | OUTPATIENT
Start: 2024-07-25 | End: 2024-07-26

## 2024-07-25 RX ORDER — DEXTROSE MONOHYDRATE 25 G/50ML
50 INJECTION, SOLUTION INTRAVENOUS
Status: DISCONTINUED | OUTPATIENT
Start: 2024-07-25 | End: 2024-07-25 | Stop reason: HOSPADM

## 2024-07-25 RX ORDER — ACETAMINOPHEN 325 MG/1
TABLET ORAL
Status: COMPLETED
Start: 2024-07-25 | End: 2024-07-25

## 2024-07-25 RX ORDER — FERROUS SULFATE 325(65) MG
325 TABLET, DELAYED RELEASE (ENTERIC COATED) ORAL
Status: DISCONTINUED | OUTPATIENT
Start: 2024-07-25 | End: 2024-07-26

## 2024-07-25 RX ORDER — ACETAMINOPHEN 325 MG/1
650 TABLET ORAL ONCE
Status: COMPLETED | OUTPATIENT
Start: 2024-07-25 | End: 2024-07-25

## 2024-07-25 RX ORDER — ONDANSETRON 2 MG/ML
4 INJECTION INTRAMUSCULAR; INTRAVENOUS EVERY 6 HOURS PRN
Status: DISCONTINUED | OUTPATIENT
Start: 2024-07-25 | End: 2024-07-26

## 2024-07-25 RX ORDER — HYDROCODONE BITARTRATE AND ACETAMINOPHEN 5; 325 MG/1; MG/1
2 TABLET ORAL ONCE AS NEEDED
Status: DISCONTINUED | OUTPATIENT
Start: 2024-07-25 | End: 2024-07-25 | Stop reason: HOSPADM

## 2024-07-25 RX ORDER — KETOROLAC TROMETHAMINE 15 MG/ML
15 INJECTION, SOLUTION INTRAMUSCULAR; INTRAVENOUS EVERY 6 HOURS
Status: DISCONTINUED | OUTPATIENT
Start: 2024-07-25 | End: 2024-07-26

## 2024-07-25 RX ORDER — EPHEDRINE SULFATE 50 MG/ML
INJECTION INTRAVENOUS AS NEEDED
Status: DISCONTINUED | OUTPATIENT
Start: 2024-07-25 | End: 2024-07-25 | Stop reason: SURG

## 2024-07-25 RX ORDER — LABETALOL HYDROCHLORIDE 5 MG/ML
5 INJECTION, SOLUTION INTRAVENOUS EVERY 5 MIN PRN
Status: DISCONTINUED | OUTPATIENT
Start: 2024-07-25 | End: 2024-07-25 | Stop reason: HOSPADM

## 2024-07-25 RX ORDER — TRAMADOL HYDROCHLORIDE 50 MG/1
1 TABLET ORAL
COMMUNITY
Start: 2024-07-19

## 2024-07-25 RX ORDER — ASPIRIN 81 MG/1
81 TABLET ORAL 2 TIMES DAILY
Status: DISCONTINUED | OUTPATIENT
Start: 2024-07-25 | End: 2024-07-26

## 2024-07-25 RX ORDER — TRANEXAMIC ACID 10 MG/ML
1000 INJECTION, SOLUTION INTRAVENOUS ONCE
Status: DISCONTINUED | OUTPATIENT
Start: 2024-07-25 | End: 2024-07-25 | Stop reason: HOSPADM

## 2024-07-25 RX ORDER — HYDROMORPHONE HYDROCHLORIDE 1 MG/ML
0.4 INJECTION, SOLUTION INTRAMUSCULAR; INTRAVENOUS; SUBCUTANEOUS EVERY 5 MIN PRN
Status: DISCONTINUED | OUTPATIENT
Start: 2024-07-25 | End: 2024-07-25 | Stop reason: HOSPADM

## 2024-07-25 RX ORDER — DEXAMETHASONE SODIUM PHOSPHATE 10 MG/ML
8 INJECTION, SOLUTION INTRAMUSCULAR; INTRAVENOUS ONCE
Status: COMPLETED | OUTPATIENT
Start: 2024-07-26 | End: 2024-07-26

## 2024-07-25 RX ORDER — HYDROMORPHONE HYDROCHLORIDE 1 MG/ML
0.2 INJECTION, SOLUTION INTRAMUSCULAR; INTRAVENOUS; SUBCUTANEOUS EVERY 5 MIN PRN
Status: DISCONTINUED | OUTPATIENT
Start: 2024-07-25 | End: 2024-07-25 | Stop reason: HOSPADM

## 2024-07-25 RX ORDER — ASPIRIN 81 MG/1
81 TABLET ORAL 2 TIMES DAILY
Status: SHIPPED | COMMUNITY
Start: 2024-07-25

## 2024-07-25 RX ORDER — AMOXICILLIN 250 MG
1 CAPSULE ORAL DAILY
COMMUNITY
Start: 2024-07-19

## 2024-07-25 RX ORDER — FOLIC ACID 1 MG/1
1 TABLET ORAL WEEKLY
Status: DISCONTINUED | OUTPATIENT
Start: 2024-07-25 | End: 2024-07-25

## 2024-07-25 RX ORDER — FOLIC ACID 1 MG/1
1 TABLET ORAL WEEKLY
COMMUNITY
Start: 2024-04-19

## 2024-07-25 RX ORDER — OXYCODONE HYDROCHLORIDE 5 MG/1
1 TABLET ORAL EVERY 4 HOURS PRN
COMMUNITY
Start: 2024-07-19

## 2024-07-25 RX ORDER — LEVOTHYROXINE SODIUM 0.07 MG/1
75 TABLET ORAL
Status: DISCONTINUED | OUTPATIENT
Start: 2024-07-26 | End: 2024-07-26

## 2024-07-25 RX ORDER — SACUBITRIL AND VALSARTAN 24; 26 MG/1; MG/1
1 TABLET, FILM COATED ORAL 2 TIMES DAILY
COMMUNITY

## 2024-07-25 RX ORDER — GABAPENTIN 300 MG/1
300 CAPSULE ORAL EVERY 12 HOURS
Status: DISCONTINUED | OUTPATIENT
Start: 2024-07-25 | End: 2024-07-26

## 2024-07-25 RX ORDER — TRANEXAMIC ACID 10 MG/ML
INJECTION, SOLUTION INTRAVENOUS AS NEEDED
Status: DISCONTINUED | OUTPATIENT
Start: 2024-07-25 | End: 2024-07-25 | Stop reason: SURG

## 2024-07-25 RX ORDER — ONDANSETRON 2 MG/ML
INJECTION INTRAMUSCULAR; INTRAVENOUS AS NEEDED
Status: DISCONTINUED | OUTPATIENT
Start: 2024-07-25 | End: 2024-07-25 | Stop reason: SURG

## 2024-07-25 RX ORDER — NAPROXEN 500 MG/1
500 TABLET ORAL DAILY
COMMUNITY
End: 2024-07-26

## 2024-07-25 RX ORDER — ACETAMINOPHEN 500 MG
1000 TABLET ORAL ONCE AS NEEDED
Status: DISCONTINUED | OUTPATIENT
Start: 2024-07-25 | End: 2024-07-25 | Stop reason: HOSPADM

## 2024-07-25 RX ORDER — ACETAMINOPHEN 500 MG
1000 TABLET ORAL ONCE
Status: DISCONTINUED | OUTPATIENT
Start: 2024-07-25 | End: 2024-07-25 | Stop reason: HOSPADM

## 2024-07-25 RX ORDER — HYDROMORPHONE HYDROCHLORIDE 1 MG/ML
0.6 INJECTION, SOLUTION INTRAMUSCULAR; INTRAVENOUS; SUBCUTANEOUS EVERY 5 MIN PRN
Status: DISCONTINUED | OUTPATIENT
Start: 2024-07-25 | End: 2024-07-25 | Stop reason: HOSPADM

## 2024-07-25 RX ORDER — SODIUM CHLORIDE, SODIUM LACTATE, POTASSIUM CHLORIDE, CALCIUM CHLORIDE 600; 310; 30; 20 MG/100ML; MG/100ML; MG/100ML; MG/100ML
INJECTION, SOLUTION INTRAVENOUS CONTINUOUS
Status: DISCONTINUED | OUTPATIENT
Start: 2024-07-25 | End: 2024-07-25 | Stop reason: HOSPADM

## 2024-07-25 RX ORDER — MIDAZOLAM HYDROCHLORIDE 1 MG/ML
INJECTION INTRAMUSCULAR; INTRAVENOUS AS NEEDED
Status: DISCONTINUED | OUTPATIENT
Start: 2024-07-25 | End: 2024-07-25 | Stop reason: SURG

## 2024-07-25 RX ORDER — NICOTINE POLACRILEX 4 MG
15 LOZENGE BUCCAL
Status: DISCONTINUED | OUTPATIENT
Start: 2024-07-25 | End: 2024-07-25 | Stop reason: HOSPADM

## 2024-07-25 RX ORDER — SODIUM CHLORIDE, SODIUM LACTATE, POTASSIUM CHLORIDE, CALCIUM CHLORIDE 600; 310; 30; 20 MG/100ML; MG/100ML; MG/100ML; MG/100ML
INJECTION, SOLUTION INTRAVENOUS CONTINUOUS
Status: DISCONTINUED | OUTPATIENT
Start: 2024-07-25 | End: 2024-07-26

## 2024-07-25 RX ORDER — DIPHENHYDRAMINE HYDROCHLORIDE 50 MG/ML
12.5 INJECTION INTRAMUSCULAR; INTRAVENOUS EVERY 4 HOURS PRN
Status: DISCONTINUED | OUTPATIENT
Start: 2024-07-25 | End: 2024-07-26

## 2024-07-25 RX ORDER — NALOXONE HYDROCHLORIDE 0.4 MG/ML
80 INJECTION, SOLUTION INTRAMUSCULAR; INTRAVENOUS; SUBCUTANEOUS AS NEEDED
Status: DISCONTINUED | OUTPATIENT
Start: 2024-07-25 | End: 2024-07-25 | Stop reason: HOSPADM

## 2024-07-25 RX ORDER — BISACODYL 10 MG
10 SUPPOSITORY, RECTAL RECTAL
Status: DISCONTINUED | OUTPATIENT
Start: 2024-07-25 | End: 2024-07-26

## 2024-07-25 RX ORDER — TRAMADOL HYDROCHLORIDE 50 MG/1
50 TABLET ORAL EVERY 6 HOURS SCHEDULED
Status: DISCONTINUED | OUTPATIENT
Start: 2024-07-25 | End: 2024-07-26

## 2024-07-25 RX ORDER — GLYCOPYRROLATE 0.2 MG/ML
INJECTION, SOLUTION INTRAMUSCULAR; INTRAVENOUS AS NEEDED
Status: DISCONTINUED | OUTPATIENT
Start: 2024-07-25 | End: 2024-07-25 | Stop reason: SURG

## 2024-07-25 RX ORDER — GABAPENTIN 300 MG/1
300 CAPSULE ORAL EVERY 12 HOURS
COMMUNITY

## 2024-07-25 RX ORDER — DEXAMETHASONE SODIUM PHOSPHATE 10 MG/ML
INJECTION, SOLUTION INTRAMUSCULAR; INTRAVENOUS AS NEEDED
Status: DISCONTINUED | OUTPATIENT
Start: 2024-07-25 | End: 2024-07-25 | Stop reason: SURG

## 2024-07-25 RX ORDER — DIPHENHYDRAMINE HYDROCHLORIDE 50 MG/ML
25 INJECTION INTRAMUSCULAR; INTRAVENOUS ONCE AS NEEDED
Status: ACTIVE | OUTPATIENT
Start: 2024-07-25 | End: 2024-07-25

## 2024-07-25 RX ORDER — METOCLOPRAMIDE HYDROCHLORIDE 5 MG/ML
10 INJECTION INTRAMUSCULAR; INTRAVENOUS EVERY 8 HOURS PRN
Status: DISCONTINUED | OUTPATIENT
Start: 2024-07-25 | End: 2024-07-26

## 2024-07-25 RX ORDER — SENNOSIDES 8.6 MG
17.2 TABLET ORAL NIGHTLY
Status: DISCONTINUED | OUTPATIENT
Start: 2024-07-25 | End: 2024-07-26

## 2024-07-25 RX ORDER — DIPHENHYDRAMINE HCL 25 MG
25 CAPSULE ORAL EVERY 4 HOURS PRN
Status: DISCONTINUED | OUTPATIENT
Start: 2024-07-25 | End: 2024-07-26

## 2024-07-25 RX ADMIN — SODIUM CHLORIDE, SODIUM LACTATE, POTASSIUM CHLORIDE, CALCIUM CHLORIDE: 600; 310; 30; 20 INJECTION, SOLUTION INTRAVENOUS at 08:04:00

## 2024-07-25 RX ADMIN — EPHEDRINE SULFATE 10 MG: 50 INJECTION INTRAVENOUS at 08:25:00

## 2024-07-25 RX ADMIN — EPHEDRINE SULFATE 15 MG: 50 INJECTION INTRAVENOUS at 08:01:00

## 2024-07-25 RX ADMIN — TRANEXAMIC ACID 1000 MG: 10 INJECTION, SOLUTION INTRAVENOUS at 07:30:00

## 2024-07-25 RX ADMIN — MIDAZOLAM HYDROCHLORIDE 1 MG: 1 INJECTION INTRAMUSCULAR; INTRAVENOUS at 07:12:00

## 2024-07-25 RX ADMIN — DEXAMETHASONE SODIUM PHOSPHATE 4 MG: 4 MG/ML VIAL (ML) INJECTION at 07:25:00

## 2024-07-25 RX ADMIN — SODIUM CHLORIDE, SODIUM LACTATE, POTASSIUM CHLORIDE, CALCIUM CHLORIDE: 600; 310; 30; 20 INJECTION, SOLUTION INTRAVENOUS at 07:04:00

## 2024-07-25 RX ADMIN — DEXAMETHASONE SODIUM PHOSPHATE 4 MG: 10 INJECTION, SOLUTION INTRAMUSCULAR; INTRAVENOUS at 09:18:00

## 2024-07-25 RX ADMIN — ONDANSETRON 4 MG: 2 INJECTION INTRAMUSCULAR; INTRAVENOUS at 07:25:00

## 2024-07-25 RX ADMIN — ROPIVACAINE HYDROCHLORIDE 20 ML: 5 INJECTION, SOLUTION EPIDURAL; INFILTRATION; PERINEURAL at 09:18:00

## 2024-07-25 RX ADMIN — MIDAZOLAM HYDROCHLORIDE 1 MG: 1 INJECTION INTRAMUSCULAR; INTRAVENOUS at 07:55:00

## 2024-07-25 RX ADMIN — GLYCOPYRROLATE 0.2 MG: 0.2 INJECTION, SOLUTION INTRAMUSCULAR; INTRAVENOUS at 07:25:00

## 2024-07-25 NOTE — OPERATIVE REPORT
OPERATIVE REPORT    Facility: [facility]  Patient name: Erlinda Knight  : 1950        Medical record: [id number]  Date of procedure: 2024  Pre-operative diagnosis: Right knee end-stage degenerative joint disease  Post-operative diagnosis: Same  Procedure performed: Right total knee arthroplasty  Implants: Cari [employer] TKA   Femur: size 4 Persona  CR   Tibial tray: size C Persona    Patella - 29 mm   Bearing - 14 mm Medial Congruent  Surgeon: Cruz Tran M.D.   Assistant(s):  1. Al Brady, MSN, FNP-BC  2. Morris Potter SA  Anesthesia: Epidural/Adductor Canal Femoral Nerve Block  Estimated blood loss: 150 milliliters   Drains: none  Specimens: None  Complications: None apparent            INDICATIONS:  Erlinda Knight is a pleasant 74 year old year old who presented to my office with a long history of knee pain. The patient failed conservative therapy and we discussed surgical treatment options including total knee arthroplasty. Prior to surgery we discussed the risks, benefits, alternatives to surgery, and surgical convalescence.  Surgical consent was obtained both in the office, as well as the day of surgery. Risks of the procedure include, but are not limited to, infection, DVT, PE, damage to nerves or blood vessels at the time of surgery, bleeding and blood loss, stiffness/arthrofibrosis, and risk of loosening or failure of the components requiring revision surgery. The patient expressed understanding and wished to proceed with the surgery. An informed consent form was signed and placed on the chart prior to coming to the Operating Room.       PROCEDURE: The patient was brought to the operating room and once obtaining satisfactory anesthesia was placed in the supine position. The knee was prepped and draped in the usual sterile fashion for a total knee replacement.     A tourniquet was in the room but not applied/inflated during the procedure in an effort to diminish quadriceps  pain/dysfunction post operatively.     A surgical timeout was held verifying the site, procedure, and that pre-operative antibiotics had been given.  A longitudinal incision was over the anterior aspect of the knee exposing the extensor mechanism. An arthrotomy was performed and the patella displaced laterally. The gross pathologic findings revealed severe degenerative arthritis.    Subperiosteal dissection was continued around the proximal medial tibia. The necessary soft tissue release was performed to correct the fixed angular deformity. Care was taken to protect and preserve the medial collateral ligament.       Following this, the drill was used to open the femoral canal. After over drilling the canal, the intramedullary femoral guide was seated and the distal femur was resected at the stated valgus angle.     The extramedullary tibial cutting guide was then placed and the proximal tibia was resected at the appropriate level perpendicular to the long axis of the tibia.    The epicondylar axis and AP axis were determined.  The femoral sizing guide was set at the appropriate degree of external rotation.  The femur was then sized and the appropriate component selected. The anterior and posterior condyles were then resected with the appropriately sized guide and oscillating saw.    With the knee at 90 degrees of flexion, laminar spacers were placed between the femur and tibia. The anterior cruciate ligament was excised. The posterior cruciate ligament was left intact.  A medial and lateral meniscectomy were performed. A mixture of 30cc of 0.5% ropivicaine, 10mg Morphine, 30mg toradol, 0.5mg of epinephrine, clonidine 1mL were injected into the knee joint capsule posteriorly while the lamina spreaders were in position and at the capsulotomy incision sites prior to closure for local anesthesia.     The flexion and extension gaps were checked with the appropriate sized spacer and noted to be well balanced. At this  time, the tibial cut was checked with the spacer block and the alignment fortino.    The distal end of the femur was sculptured with the notch and chamfer cutting guide using an oscillating saw. The tibial fixation hole was created with the tibial template and broach.     The synovium was excised from around the patella, after which the patella thickness was measured with calipers.  The patella was then prepared with the patellar reaming system.  The appropriate sized patellar template was seated and the three fixation holes were created with a drill.      A bone plug was then shaped and place in the opening created for the intramedullary femoral guide.    The trial components were then placed and the knee was found to have proper alignment and was stable through a full range of flexion and extension. The trial components were removed and the bony surfaces were cleaned with pulsatile lavage and an antibiotic solution. The bone was then dried and the permanent components were cemented in a sequential fashion. The tibial component was cemented first. Set in the proper position and rotation.  The tibial component was impacted into place, it was fully seated and excess cemented was removed.  The femoral component was then cemented in place followed by the patella. Excess cement was removed.     Once the cement was hardened the tibial articular surface was implanted. The knee was then reduced and found to have good flexion, full extension with good stability and proper alignment. The patella tracked central.    A dilute (0.35%) betadine lavage was placed in the arthrotomy site to soak the components for 3 minutes prior to wound closure. The solution was made by adding 17.5 ml of 10% povidone-iodine in 500 cc of normal saline, resulting in a 0.35% dilution. This solution was then removed from the knee and the knee was copiously irrigated.    Hemostasis was obtained with electrocautery. The knee irrigated with pulsatile lavage  and antibiotic solution followed by normal saline.     The arthrotomy was closed with #1 stratafix barbed suture. The subcutaneous tissue was closed in layers with # 0 and # 2-0 Vicryl interrupted sutures. The skin was closed with 3-0 stratfix barbed sutures and dermabond. A sterile compressive dressing was applied. The patient tolerated the procedure well, without complication, and was transferred to the recovery room in a stable condition. The sponge and instrument count was correct.      A surgical first assistant was required and necessary for the completion of this case to aid in manipulation and positioning of the extremity while the surgeon operated.  Their assistance was vital to the safety and success of the procedure.     Deep venous thrombosis prophylaxis will consist of aspirin twice daily according to CHEST guidelines. And ok to resume preop plavix tomorrow            ____________________________  Cruz Tran M.D.

## 2024-07-25 NOTE — DISCHARGE INSTRUCTIONS
Sometimes managing your health at home requires assistance.  The [facility] team has recognized your preference to use [facility].  They can be reached by phone at [phone].  The fax number for your reference is [phone].  A representative from the home health agency will contact you or your family to schedule your first visit.     Knee Replacement Discharge Instructions    Dear Patient,     [facility] cares about your progress with recovery following your joint replacement surgery.     300 days from your scheduled surgery, [facility] will send you a follow-up survey to help us understand how your surgery impacted your mobility, pain, and overall quality of life. Please make every effort to complete this survey. The information collected from this survey will be used by your physician to track your recovery.     Sincerely,     [facility] [place]      Activity    Bathing  No tub baths, pools, or saunas until cleared by surgeon (about 4-6 weeks because it takes that long for the incision on the skin to heal and be a barrier to prevent infection).  When allowed to shower:    IF AQUACEL - dressing is waterproof and does not require being covered to keep it dry.  Pat dressing and surrounding skin dry after shower              AQUACEL          Gauze dressings are NOT waterproof and REQUIRE being covered with a waterproof barrier to keep the dressing and the incision dry.  SARAN WRAP, GLAD WRAP, and PRESS N SEAL WORK  REALLY WELL BUT ANY PLASTIC WRAP WILL DO.   Do not wash incision.   Remove entire wrapping and old dressing (if Gauze) after showering. Pat dry if necessary WITH A CLEAN TOWEL and cover incision with dry sterile gauze and paper tape. For other types of dressings, follow surgeon’s orders.                                 GAUZE          Driving  Do not drive until cleared by surgeon. This is usually in four to six weeks after surgery.  Discuss at follow-up office visit.   Not allowed while taking narcotic pain medication or muscle relaxants.    Sex  Usually allowed after four to six weeks - check with surgeon at your office visit.    Return to work  Usually allowed after four to six weeks. Discuss specific work activities with your surgeon.    Restrictions  For knee replacement surgery, follow instructions provided by physical therapy.  Do NOT put a pillow under your knee as it may be more difficult to straighten afterwards.    No smoking  Avoid smoking. It is known to cause breathing problems and can decrease the rate of healing.    Incision care/Dressing changes  Wash hands before and after dressing changes.  FOR DRY GAUZE DRESSING:  Change dressing daily using dry sterile gauze and paper tape once Aquacel (waterproof) dressing changed (which is about 7 days after surgery). Continue this until you follow up in the office with your surgeon. Have knee bent when changing dressing for more ease of bending afterwards.  There could be a small amount of redness around the staples or incision; this is normal.  Watch for increased redness, warmth, any odor, increased drainage or opening of the incision. A little clear yellow or blood tinged drainage is normal up to 2 weeks after surgery but it should be less every day until it stops.  Call physician if you notice any concerning changes.  Sutures/staples will be removed at first office visit (10 days- 3 weeks).                          GAUZE                                                   Medication  Anticoagulants = blood thinners (Xarelto, Eliquis, Lovenox, Coumadin, or Aspirin)  Pill or shot form depending on what your physician orders.   IF placed on Coumadin, you may also need lab work done for monitoring.  You will bleed easier and bruise easier while on these medications.   Usually you will be on a blood thinner for about 2-5 weeks depending what physician orders.  Contact your physician if you  have signs of bruising, nose bleeds or blood in your urine. You may consider using electric razors and soft bristle toothbrushes.  Do not take aspirin while taking blood thinners unless ordered by your physician.  Review anticoagulant education information sheet provided.    Discomfort  Surgical discomfort is normal for one to two months.  Have realistic goals and keep a positive outlook.  You may need pain medication regularly (every 4-6 hours) the first 2 weeks and then begin to decrease how often you are taking it.  Take pain medication as prescribed with food, especially before therapy, allowing 30-60 minutes to take effect.  Do not drink alcohol while on pain medication.  Keep pain manageable; pain should not disrupt your sleep or activities like getting out of bed or walking.  As you have less discomfort, decrease the amount of pain medication you take. Use plain Tylenol (acetaminophen) for less severe pain.  Some pain medications have Tylenol (acetaminophen) in them such as Norco and Percocet. Do NOT take Tylenol (acetaminophen) within 4 hours of a dose of these medications.  Apply ice or cold therapy to surgical site for 20 minutes at least four times a day, especially after therapy. Be sure there is a thin cloth barrier between skin and ice or cold therapy.  Change position at least every 45 minutes while awake to avoid stiffness or increased discomfort.  For knee replacements- may elevate your leg by placing a pillow under entire leg. Do not place pillow only under the knee.  Have realistic goals and keep a positive outlook.  Deep breathing and relaxation techniques and distractions can help!  If you focus on something else, you do not experience the pain the same. Take advantage of everything available to your to help control you discomfort.  Contact physician if discomfort does not respond to pain medication.    Body changes  Constipation is common with the use of narcotics.  Eat fiber rich foods and  drink plenty of fluids.  Use stool softeners such as Colace or Senakot while on narcotics, and laxatives such as Miralax or Milk of Magnesia if needed.   An enema or suppository may be needed if above measures do not work.    Prevention of infection and promotion of healing  Good hand washing is important. Everyone should wash their hands or use hand  as soon as they walk in your house-whether they live there or are visiting.  Keep bed linen/clothing freshly laundered.  Do not allow others or pets to touch your incision.  Avoid people that have colds or the flu.  Your surgeon may recommend that you take antibiotics before you undergo any dental or other invasive surgical procedures after your joint replacement. Speak with your physician about this at your post-op office visit.  Eat a balanced diet high in fiber and drink plenty of fluids.   Continue using incentive spirometry because narcotics make you sleepy so you may not take good deep breaths. We do not want you to get pneumonia.     Post op Office visits  Schedule 10 days to 3 weeks after surgery WITH SURGEON’s office.  Additional visits may need to be scheduled. Your physician will discuss this at first post-op office visit.  Schedule outpatient physical therapy per your surgeon’s orders.  Schedule one week follow up after surgery WITH PRIMARY CARE PHYSICIAN; review your medications over last 6 months. Your body gets stressed by surgery and that stress can affect all your other health issues (such as high blood pressure, diabetes, CHF, afib, and asthma just to name a few).  We don’t want those other health issues to cause you to get readmitted to the hospital; much better for you to catch developing problems and prevent them from becoming larger ones.  TATA HOSE - IF ordered by your surgeon, wear these during the day and off at night.      Notify your surgeon if you notice any  of the following signs  Separation of incision line.  Increased redness,  swelling, or warmth of skin around incision.  Increased or foul smelling drainage from incision  Red streaks on skin near incision.  Temperature >101 F.  Increased pain at incision not relieved by pain medication.      Signs of blood clot  Pain, excessive tenderness, redness, or swelling in leg or calf (other than incision site).  (CALL SURGEON)      Go directly to the ER or CALL 911 if  you:  become short of breath  have chest pain  cough up blood  have unexplained anxiety with breathing  Traveling and Handicapped parking  Check with you surgeon when allowed so you don’t set yourself up for greater chance of complications.  If traveling by car, get out to stretch every 2 hours.  This helps prevent stiffness.  You may need to do this any time you travel for the first year after surgery.  If traveling by plane, BEFORE you get into a security line, let them know that you had your hip replaced, as you will most likely set off the metal detector. The doctors no longer provide an identification card for this as they are easily copied. ALSO request a wheelchair the first year to board and get off a plane…this aids in priority seating and you should sit on the aisle or at the bulkhead where you can easily stretch your legs and get up to walk up and down the aisles…this helps prevent blood clots and stiffness.  TEMPORARY HANDICAP PARKING APPLICATION  (good for 3-6 months)  - At Surgeon or PCP visit, request they fill out the form, then go to DM[employer] (only time you do not wait in a long line there). Some [facility] offices provide the same service. ([place] and [place] have this service; if you live in another [facility], you may check with them as well). You need space to open car doors to position yourself properly with walker to get in and out of your car safely; some parking spaces are  practically on top of each other and do not give you enough room.    SPECIAL  INSTRUCTIONS:  Spanda- knee replacement (single  layer compression sleeve):  All patients should wear the compression sleeves (SPANDA-) until first follow up visit to surgeon’s office ( about 2-3 weeks) and then check with surgeon if need to continue use.  Take off to shower. Best to keep on as much as possible, even at night.  Wash with mild soap and water; DRIP dry overnight.    Directions to put on and off:  IT TAKES 2 PIECES OF SPANDA- (compression sleeves), (USUALLY DIFFERENT SIZES because a calf is usually smaller than a knee.)   Use the longer tube (spanda-/compression sleeve) pulled up over the calf.   This 1st piece (spanda-/compression sleeve) should be on the calf part of the leg, from just below the knee to the ball of the foot to expose the toes.   The 2nd piece (shorter compression sleeve) is pulled over and past the first sleeve onto the knee. The lower edge of the knee portion should overlap the top of the calf spanda- by a few inches. This is the only place where the 2 different pieces overlap.  The top edge of the second spanda- should be about a few inches above the knee but it should NOT be rolling down. The spanda- should be flat so that it does not roll and become too tight.  Makes sure it is NOT bunched up anywhere.   IF the spanda- feels TOO tight, then REMOVE it and call your surgeon to let them know.

## 2024-07-25 NOTE — ANESTHESIA PROCEDURE NOTES
Spinal Block    Date/Time: 7/25/2024 7:15 AM    Performed by: Jose Calderón MD  Authorized by: Jose Calderón MD      General Information and Staff    Start Time:  7/25/2024 7:15 AM  End Time:  7/25/2024 7:18 AM  Anesthesiologist:  Jose Calderón MD  Performed by:  Anesthesiologist  Patient Location:  OR  Site identification: surface landmarks    Preanesthetic Checklist: patient identified, IV checked, risks and benefits discussed, monitors and equipment checked, pre-op evaluation, timeout performed, anesthesia consent and sterile technique used      Procedure Details    Patient Position:  Sitting  Prep: ChloraPrep    Monitoring:  Cardiac monitor, heart rate and continuous pulse ox  Approach:  Midline  Location:  L4-5  Injection Technique:  Single-shot    Needle    Needle Type:  Sprotte  Needle Gauge:  24 G  Needle Length:  3.5 in    Assessment    Sensory Level:   Events: clear CSF, CSF aspirated, well tolerated and blood negative      Additional Comments     Patient in sitting position with ASA monitors on.  Croatian  utilized via telephone.  Lumbar area prepped and draped in sterile fashion.  Lido 1% skin infiltration at L4-L5 interspace.  21G 30mm introducer needle placed midline at L4-L5 interspace.  24G Sprotte 3.5\" spinal needle placed through introducer successfully into intrathecal space with +clear CSF free flow.  After clear aspirate, mepivicaine 2% 2.0ml given intrathecally.  No heme or paresthesias noted.  Patient tolerated procedure well without any apparent complications.

## 2024-07-25 NOTE — ANESTHESIA PREPROCEDURE EVALUATION
PRE-OP EVALUATION    Patient Name: Erlinda Knight    Admit Diagnosis: PRIMARY OSTEOARTHRITIS RIGHT KNEE    Pre-op Diagnosis: PRIMARY OSTEOARTHRITIS RIGHT KNEE    RIGHT TOTAL KNEE ARTHROPLASTY    Anesthesia Procedure: RIGHT TOTAL KNEE ARTHROPLASTY (Right)    Surgeons and Role:     * Cruz Tran MD - Primary    Pre-op vitals reviewed.  Temp: 98.3 °F (36.8 °C)  Pulse: 53  Resp: 16  BP: 133/71  SpO2: 100 %  Body mass index is 24.8 kg/m².    Current medications reviewed.  Hospital Medications:  • acetaminophen (Tylenol Extra Strength) tab 1,000 mg  1,000 mg Oral Once   • glucose (Dex4) 15 GM/59ML oral liquid 15 g  15 g Oral Q15 Min PRN    Or   • glucose (Glutose) 40% oral gel 15 g  15 g Oral Q15 Min PRN    Or   • glucose-vitamin C (Dex-4) chewable tab 4 tablet  4 tablet Oral Q15 Min PRN    Or   • dextrose 50% injection 50 mL  50 mL Intravenous Q15 Min PRN    Or   • glucose (Dex4) 15 GM/59ML oral liquid 30 g  30 g Oral Q15 Min PRN    Or   • glucose (Glutose) 40% oral gel 30 g  30 g Oral Q15 Min PRN    Or   • glucose-vitamin C (Dex-4) chewable tab 8 tablet  8 tablet Oral Q15 Min PRN   • lactated ringers infusion   Intravenous Continuous   • tranexamic acid in sodium chloride 0.7% (Cyklokapron) 1000 mg/100mL infusion premix 1,000 mg  1,000 mg Intravenous Once   • ceFAZolin (Ancef) 2g in 10mL IV syringe premix  2 g Intravenous Once   • povidone-iodine (Betadine) 10 % 17.5 mL in sodium chloride 0.9 % 500 mL irrigation   Irrigation Once (Intra-Op)   • clonidine/epinephrine/ropivacaine/ketorolac in 0.9% NaCl 60 mL pain cocktail syringe for knee arthroplasty   Infiltration Once (Intra-Op)       Outpatient Medications:     Medications Prior to Admission   Medication Sig Dispense Refill Last Dose   • oxyCODONE 5 MG Oral Tab Take 1 tablet (5 mg total) by mouth every 4 (four) hours as needed.   post op   • traMADol 50 MG Oral Tab Take 1 tablet (50 mg total) by mouth every 4 to 6 hours as needed.   post op   • naproxen 500 MG  Oral Tab Take 1 tablet (500 mg total) by mouth daily.   7/24/2024 at 0800   • ENTRESTO 24-26 MG Oral Tab Take 1 tablet by mouth 2 (two) times daily.   7/24/2024 at 1900   • sennosides-docusate 8.6-50 MG Oral Tab Take 1 tablet by mouth daily.   post op   • acetaminophen 325 MG Oral Tab Take 2 tablets (650 mg total) by mouth every 4 (four) hours.   post op   • gabapentin 300 MG Oral Cap Take 1 capsule (300 mg total) by mouth every 12 (twelve) hours.   7/24/2024 at 0800   • Cholecalciferol (VITAMIN D3) 1.25 MG (06988 UT) Oral Cap Take 1 capsule by mouth once a week.   7/24/2024 at 0800   • aspirin 81 MG Oral Tab EC Take 1 tablet (81 mg total) by mouth daily. 20 tablet 0 7/18/2024   • clopidogrel 75 MG Oral Tab Take 1 tablet (75 mg total) by mouth daily. 30 tablet 2 7/18/2024   • metFORMIN HCl 1000 MG Oral Tab Take 1 tablet (1,000 mg total) by mouth daily with breakfast.   7/23/2024   • levothyroxine 75 MCG Oral Tab Take 1 tablet (75 mcg total) by mouth before breakfast.   7/25/2024       Allergies: Other      Anesthesia Evaluation        Anesthetic Complications           GI/Hepatic/Renal                                 Cardiovascular      ECG reviewed.  Exercise tolerance: good     MET: >4      (+) hypertension     (+) CAD                                Endo/Other      (+) diabetes and well controlled, type 2,                          Pulmonary                           Neuro/Psych          (+) CVA                    CAD s/p stenting 2017 x 2: last dose of plavix one week ago.      S/p CVA: no residual deficits per son.    8/2023 ECHO findings:  Conclusions:     1. Left ventricle: The cavity size was normal. Wall thickness was normal.      Systolic function was normal. The estimated ejection fraction was 55-60%,      by visual assessment. No diagnostic evidence for regional wall motion      abnormalities. Left ventricular diastolic function parameters were normal      for the patient's age.   2. Right ventricle: The  cavity size was normal. Systolic function was      normal.   3. Atrial septum: Agitated saline contrast study showed no atrial level      shunt, at baseline or with provocation.   4. Aortic valve: The valve was probably trileaflet. The leaflets were mildly      thickened. Transvalvular velocity was minimally increased. There was      sclerosis no evidence for stenosis. There was no significant      regurgitation.   5. Pulmonary arteries: Systolic pressure was within the normal range, in the      range of 25mm Hg to 30mm Hg. Estimated pulmonary artery diastolic      pressure was 6mm Hg.   6. Pericardium, extracardiac: There was no pericardial effusion.   Impressions:  This study is compared with previous dated 08/13/2015: No   significant change since prior study.             Past Surgical History:   Procedure Laterality Date   • Angioplasty (coronary)     • Appendectomy       Social History     Socioeconomic History   • Marital status:    Tobacco Use   • Smoking status: Never   Substance and Sexual Activity   • Alcohol use: No   • Drug use: No     History   Drug Use No     Available pre-op labs reviewed.  Lab Results   Component Value Date    WBC 5.4 07/06/2024    RBC 3.10 (L) 07/06/2024    HGB 9.9 (L) 07/06/2024    HCT 29.2 (L) 07/06/2024    MCV 94.2 07/06/2024    MCH 31.9 07/06/2024    MCHC 33.9 07/06/2024    RDW 12.8 07/06/2024    .0 07/06/2024     Lab Results   Component Value Date     07/06/2024    K 4.3 07/06/2024     07/06/2024    CO2 23.0 07/06/2024    BUN 33 (H) 07/06/2024    CREATSERUM 1.14 (H) 07/06/2024    GLU 92 07/06/2024    CA 9.8 07/06/2024            Airway      Mallampati: III  Mouth opening: >3 FB  TM distance: 4 - 6 cm  Neck ROM: full Cardiovascular    Cardiovascular exam normal.         Dental      Dental appliance(s): upper dentures and lower dentures       Pulmonary    Pulmonary exam normal.                 Other findings  Dentition grossly normal.        ASA: 3    Plan: spinal and general  NPO status verified and patient meets guidelines.    Post-procedure pain management plan discussed with surgeon and patient.  Surgeon requests: regional block  Comment: Adductor canal blockade r/b/a d/w patient.  Plan/risks discussed with: patient and child/children  Use of blood product(s) discussed with: patient and child/children            Present on Admission:  **None**

## 2024-07-25 NOTE — ANESTHESIA PROCEDURE NOTES
Regional Block    Date/Time: 7/25/2024 9:16 AM    Performed by: Jose Calderón MD  Authorized by: Jose Calderón MD      General Information and Staff    Start Time:  7/25/2024 9:16 AM  End Time:  7/25/2024 9:18 AM  Anesthesiologist:  Jose Calderón MD  Performed by:  Anesthesiologist  Patient Location:  OR      Site Identification: real time ultrasound guided and image stored and retrievable    Block site/laterality marked before start: site marked  Reason for Block: at surgeon's request and post-op pain management    Preanesthetic Checklist: 2 patient identifers, IV checked, risks and benefits discussed, monitors and equipment checked, pre-op evaluation, timeout performed, anesthesia consent, sterile technique used, no prohibitive neurological deficits and no local skin infection at insertion site      Procedure Details    Patient Position:  Supine  Prep: ChloraPrep    Monitoring:  Cardiac monitor, continuous pulse ox and blood pressure cuff  Block Type:  Adductor canal  Laterality:  Right  Injection Technique:  Single-shot    Needle    Needle Type:  Short-bevel and echogenic  Needle Gauge:  21 G  Needle Length:  100 mm  Needle Localization:  Ultrasound guidance  Reason for Ultrasound Use: appropriate spread of the medication was noted in real time and no ultrasound evidence of intravascular and/or intraneural injection            Assessment    Injection Assessment:  Good spread noted, negative resistance, negative aspiration for heme, incremental injection and low pressure  Heart Rate Change: No    - Patient tolerated block procedure well without evidence of immediate block related complications.     Medications  7/25/2024 9:16 AM      Additional Comments    Medication:  Ropivacaine 0.5% 20mL with 1:200,000 epi and dexamethasone 4mg PF.

## 2024-07-25 NOTE — H&P
Patient ID: Erlinda Knight     Chief Complaint:    No chief complaint on file.       HPI:    Erlinda Knight is a 74 year old year old patient with end stage arthritis of the right knee.  Conservative treatment has failed.  Here today for elective total knee arthroplasty.    Past Medical History:    Atherosclerosis of coronary artery    CVA (cerebral vascular accident) (HCC)    Diabetes (HCC)    High blood pressure    Hyperlipidemia    Osteoarthritis    Stroke (HCC)    Unspecified essential hypertension     Past Surgical History:   Procedure Laterality Date    Angioplasty (coronary)      Appendectomy       No current outpatient medications on file.     Social History     Tobacco Use    Smoking status: Never    Smokeless tobacco: Not on file   Substance Use Topics    Alcohol use: No     History reviewed. No pertinent family history.    ROS:      All other pertinent positives and negatives as noted above in HPI.      Physical Exam:     Gen- healthy appearing, in no acute distress, A&O x 3  HEENT- Normocephalic, atraumatic  Extremity- no gross deformity, see office notes, no changes  Neuro- motor and sensory grossly intact, moving all extremities  Pulses- Present bilateral lower extremities, toes pink, BCR        Diagnostic Studies:     End stage arthritis of right knee.      Assessment:     right knee arthritis    Plan:      Elective right TKA today      CONSENT TO PROCEDURE/SEDATION  *  Information provided regarding procedure: Yes   *  Risk/Benefits Discussed: Yes   *  Alternative /Recovery Discussed: Yes   *  Need for Blood Discussed: Yes   *  Patient /Parent Consents to Planned Procedure/Sedation and accurately recounts discussion: Yes     Diagnoses and all orders for this visit:    Pre-op testing  -     Basic Metabolic Panel (8); Future  -     CBC, Platelet; No Differential; Future  -     MSSA and MRSA Culture Screen; Future  -     EKG 12 Lead; Future  -     Type and screen; Future    Other orders  -      Vital signs; Standing  -     Notify anesthesia vital signs; Standing  -     PAT to instruct patients; Standing  -     acetaminophen (Tylenol Extra Strength) tab 1,000 mg  -     If patient has home continuous glucose monitor, PAT to instruct patient to leave continuous glucose monitor at home; Standing  -     Prior to surgery, home continuous glucose monitor must be removed by the patient.; Standing  -     Accucheck; Standing  -     Notify Anesthesiologist (specify); Standing  -     Notify Anesthesiologist (specify); Standing  -     Notify Anesthesiologist (specify); Standing  -     Implement Hypoglycemia Protocol; Standing  -     Call anesthesia for any needed insulin orders; Standing  -     glucose (Dex4) 15 GM/59ML oral liquid 15 g  -     glucose (Glutose) 40% oral gel 15 g  -     glucose-vitamin C (Dex-4) chewable tab 4 tablet  -     dextrose 50% injection 50 mL  -     glucose (Dex4) 15 GM/59ML oral liquid 30 g  -     glucose (Glutose) 40% oral gel 30 g  -     glucose-vitamin C (Dex-4) chewable tab 8 tablet  -     NPO; Standing  -     Insert/Maintain PIV; Standing  -     lactated ringers infusion  -     Activity as tolerated Until Discontinued; Standing  -     Chlorahexidine Gluconate (CHG) Bath; Standing  -     Height and weight; Standing  -     Clip Hair of Operative Joint; Standing  -     For Revisions: Do not give pre-op antibiotics until intra-op cultures have been taken; Standing  -     Void on call to OR; Standing  -     Initiate adult preop prophylactic antibiotic protocol; Standing  -     Povidone Iodine Treatment; Standing  -     Place sequential compression device; Standing  -     tranexamic acid in sodium chloride 0.7% (Cyklokapron) 1000 mg/100mL infusion premix 1,000 mg  -     Verify informed consent; Standing  -     ceFAZolin (Ancef) 2g in 10mL IV syringe premix  -     ABORH Confirmation; Standing  -     povidone-iodine (Betadine) 10 % 17.5 mL in sodium chloride 0.9 % 500 mL irrigation  -      clonidine/epinephrine/ropivacaine/ketorolac in 0.9% NaCl 60 mL pain cocktail syringe for knee arthroplasty  -     POCT Glucose; Standing

## 2024-07-25 NOTE — CONSULTS
[facility] Consult Note/ HISTORY AND PHYSICAL     CC: post op medical management    PCP: DARRELL Ch    History of Present Illness: [place] video  [id number]  Patient is a 74 year old female with PMH sig for DM, HTN, HL, stroke, hypothyroid here sp R TKA post 0. She reports fatigue otherwise no focal sx knee pain controlled  Her chronic medical problems of DM, HTN, HL are controlled.       Past Medical History:    Atherosclerosis of coronary artery    CVA (cerebral vascular accident) (HCC)    Diabetes (HCC)    High blood pressure    Hyperlipidemia    Osteoarthritis    Stroke (HCC)    Unspecified essential hypertension      Past Surgical History:   Procedure Laterality Date    Angioplasty (coronary)      Appendectomy          ALL:  No Known Allergies     sennosides, 17.2 mg, Nightly  docusate sodium, 100 mg, BID  famotidine, 20 mg, BID   Or  famotidine, 20 mg, BID  ferrous sulfate, 325 mg, Daily with breakfast  aspirin, 81 mg, BID  ceFAZolin, 2 g, Q8H  [START ON 7/26/2024] dexAMETHasone PF, 8 mg, Once  ketorolac, 15 mg, Q6H  traMADol, 50 mg, 4 times per day        Social History     Tobacco Use    Smoking status: Never    Smokeless tobacco: Not on file   Substance Use Topics    Alcohol use: No        Fam Hx  History reviewed. No pertinent family history.    Review of Systems  10 point Comprehensive ROS reviewed and negative except for what's stated above.     OBJECTIVE:  /60 (BP Location: Left arm)   Pulse 53   Temp 98 °F (36.7 °C) (Oral)   Resp 14   Ht 5' (1.524 m)   Wt 127 lb (57.6 kg)   SpO2 96%   BMI 24.80 kg/m²     GEN: NAD, AAO  NECK: supple, no LAD  HEENT- sclera anti-icteric, OP- MMM  CV: rrr, +s1/s2, PMI non displaced  LUNGS: CTAB, normal resp effort  ABL soft, NT/ND, NABS, no HSC  EXT: no LE edema b/l , DP pulses 2+ b/l sp TKA  Neuro: sensation intact, no focal deficits  SKIN- no rashes, no lesion  PSYCH- normal mentation, normal affect      LABS:   Lab Results    Component Value Date    PGLU 113 07/25/2024       Radiology: XR KNEE (1 OR 2 VIEWS), RIGHT (CPT=73560)    Result Date: 7/25/2024  PROCEDURE:  XR KNEE (1 OR 2 VIEWS), RIGHT (CPT=73560)  COMPARISON:  None.  INDICATIONS:  Knee replaced  PATIENT STATED HISTORY: (As transcribed by Technologist)  Patient offered no additional history at this time.     FINDINGS:  BONES:  Status post right knee arthroplasty with anatomic alignment.  There is no fracture or dislocation. SOFT TISSUES:  Gas in soft tissue swelling about the knee from recent surgery. EFFUSION:  None visible. OTHER:  Negative.            CONCLUSION:  Status post right total knee arthroplasty with anatomic alignment.   LOCATION:  [facility]   Dictated by (CST): Elijah Davila MD on 7/25/2024 at 11:21 AM     Finalized by (CST): Elijah Davila MD on 7/25/2024 at 11:22 AM            ASSESSMENT / PLAN:  74 year old female with PMH sig for DM, HTN, HL, stroke, hypothyroid here sp R TKA     Sp R TKA  HTN  DM  hypothyroidism  HL  Hx stroke 2015    Sp R TKA  - post op per ortho  - PT, OT evals  - DVT ppx -asa per ortho  - CBC in am expected abla  - pain control IV > po as able    DM  - hold metformin  - SSI PRN  - hypoglycemic protocol    Hypothyroidism  - resumed home synthroid    Stroke hx, HTN, HL  - cont home BP meds as tolerated  - statin  - resume plavix WHEN ok w ortho    Est dc 7/26  Outpatient records or previous hospital records reviewed as detailed above.    [facility] hospitalist to continue to follow patient while in house      [facility] Hospitalist  Pager [phone]    7/25/2024  11:37 AM

## 2024-07-25 NOTE — PHYSICAL THERAPY NOTE
Physical Therapy    Order for PT juliocesar received.  Initiated assessment - but pt is unable to lift R le at this time.  Did not feel pt was ready for standing/weight bearing tasks.  Will re-attempt prior to 4:30 p.m.

## 2024-07-25 NOTE — PLAN OF CARE
Patient admitted under Dr Kaitlin STRAUSS RTK. Vitals stable, pain controlled. Dressing dry and intact. Oriented to room and procedures via .

## 2024-07-25 NOTE — PHYSICAL THERAPY NOTE
PHYSICAL THERAPY JOINT EVALUATION - INPATIENT     Room Number: 373/373-A  Evaluation Date: 7/25/2024  Type of Evaluation: Initial  Physician Order: PT Eval and Treat    Presenting Problem: S/p R TKR 7/25  Co-Morbidities : CVA, HL, HTN  Reason for Therapy: Mobility Dysfunction and Discharge Planning    PHYSICAL THERAPY ASSESSMENT   Patient is currently functioning below baseline with bed mobility, transfers, gait, and stair negotiation. Prior to admission, patient's baseline is mod I bed mobility, transfers and gait w/ cane, independent w/ adl's.   Patient is requiring supervision and minimal assist as a result of the following impairments: decreased functional strength, pain, impaired standing balance, decreased muscular endurance, and limited R knee ROM.  Physical Therapy will continue to follow for duration of hospitalization.    Patient will benefit from continued skilled PT Services at discharge to promote prior level of function and safety with additional support and return home with home health PT.    PLAN  PT Treatment Plan: Bed mobility;Patient education;Gait training;Range of motion;Strengthening;Stair training;Transfer training;Balance training  Rehab Potential : Good  Frequency (Obs): Daily  Number of Visits to Meet Established Goals: 3    CURRENT GOALS  Goal #1  Patient is able to demonstrate supine - sit EOB @ level: supervision     Goal #2  Patient is able to demonstrate transfers Sit to/from Stand at assistance level: supervision   Goal #3    Patient is able to ambulate 150 feet with assistive device at assistance level: supervision   Goal #4    Patient will negotiate 14 stairs/one curb w/ assistive device and supervision   Goal #5       Goal #6      Goal Comments: Goals established on 7/25/2024    PHYSICAL THERAPY MEDICAL/SOCIAL HISTORY  History related to current admission: Patient is a 74 year old female admitted on 7/25/2024 from home for planned R TKR.      HOME SITUATION  Type of Home: House    Home Layout: Two level        Stairs to Bedroom: 14  Railing: Yes    Lives With: Son;Family (DIL and grandkids)  Drives: No  Patient Owned Equipment: Cane       Prior Level of [place]: Pt typically is independent w/ adl's and uses a cane w/I her home.  Pt goes up and down the flight of stairs to her room one time a day.  Reports that she is home alone at times when family is working or at school, but she manages \"fine\".  Pt st she deals w/ leg spasms at night frequently R>L and has some dizziness on/off.      SUBJECTIVE  \"Do I have to wear the brace when I sleep?\"  Reinforced during session that knee immobilizer is only temporary and should only be worn when up and walking.  Discussed how therapy team will discharge the KI tomorrow.    OBJECTIVE  Precautions: Knee immobilizer;Bed/chair alarm; needed (Pt is Mexican speaking)  Fall Risk: High fall risk    WEIGHT BEARING RESTRICTION  Weight Bearing Restriction: R lower extremity        R Lower Extremity: Weight Bearing as Tolerated       PAIN ASSESSMENT  Ratin  Location: R knee  Management Techniques: Activity promotion;Repositioning    COGNITION  Overall Cognitive Status:  WFL - within functional limits    RANGE OF MOTION AND STRENGTH ASSESSMENT  Upper extremity ROM and strength are within functional limits     Lower extremity ROM is within functional limits except for the following:   R knee flexion to ~ 60 degrees today.  Lower extremity strength is within functional limits except for the following:   R le not tested, but was able to complete a SLR prior to beginning gait.      BALANCE  Static Sitting: Good  Dynamic Sitting: Fair +  Static Standing: Poor +  Dynamic Standing: Poor +    ADDITIONAL TESTS                                    ACTIVITY TOLERANCE                         O2 WALK  Oxygen Therapy  SPO2% on Room Air at Rest: 96    NEUROLOGICAL FINDINGS                        AM-PAC '6-Clicks' INPATIENT SHORT FORM - BASIC MOBILITY  How much  difficulty does the patient currently have...  Patient Difficulty: Turning over in bed (including adjusting bedclothes, sheets and blankets)?: None   Patient Difficulty: Sitting down on and standing up from a chair with arms (e.g., wheelchair, bedside commode, etc.): A Little   Patient Difficulty: Moving from lying on back to sitting on the side of the bed?: None   How much help from another person does the patient currently need...   Help from Another: Moving to and from a bed to a chair (including a wheelchair)?: A Little   Help from Another: Need to walk in hospital room?: A Little   Help from Another: Climbing 3-5 steps with a railing?: A Lot       AM-PAC Score:  Raw Score: 19   Approx Degree of Impairment: 41.77%   Standardized Score (AM-PAC Scale): 45.44   CMS Modifier (G-Code): CK    FUNCTIONAL ABILITY STATUS  Gait Assessment   Functional Mobility/Gait Assessment  Gait Assistance: Minimum assistance  Distance (ft): 6  Assistive Device: Rolling walker  Pattern: R Decreased stance time;Comment (Step to gait pattern)    Skilled Therapy Provided    Bed Mobility:  Rolling: Left w/ hob nearly flat - mod I  Supine to sit: Mod I w/ hob nearly flat   Sit to supine: NT     Transfer Mobility:  Sit to stand: Cues for proper hand placement and min a of 1   Stand to sit: CGA  Gait = Had pt shift weight side to side prior to initiating gait.  Had pt taken a couple of steps backwards toward the bed and then forward to the chair.  Pt's r knee did not buckle w/ knee immobilizer in place and w/ instruction on proper use of rolling walker.    Therapist's Comments: Patient educated on therapy expectations following surgery and performing ankle pumps at regular intervals during the day.  Pt expresses understanding.      Exercise/Education Provided:  Bed mobility  Functional activity tolerated  Gait training  Transfer training    Patient End of Session: Up in chair;Needs met;Call light within reach;RN aware of session/findings;All  patient questions and concerns addressed;Alarm set    Patient Evaluation Complexity Level:  History Moderate - 1 or 2 personal factors and/or co-morbidities   Examination of body systems Moderate - addressing a total of 3 or more elements   Clinical Presentation Low- Stable   Clinical Decision Making Low Complexity       PT Session Time: 30 minutes  Gait Trainin minutes  Therapeutic Activity: 11 minutes  Neuromuscular Re-education: 0 minutes  Therapeutic Exercise: 1 minutes

## 2024-07-25 NOTE — HOME CARE LIAISON
Pt was set up preoperatively with [facility]. Went to the bedside and use  service and is agreeable for all services

## 2024-07-25 NOTE — ANESTHESIA POSTPROCEDURE EVALUATION
[facility]    Erlinda Knight Patient Status:  Outpatient in a Bed   Age/Gender 74 year old female MRN [id number]   Location [facility] SURGERY Attending Cruz Tran MD   Hosp Day # 0 PCP DARRELL Ch       Anesthesia Post-op Note    RIGHT TOTAL KNEE ARTHROPLASTY    Procedure Summary       Date: 07/25/24 Room / Location:  MAIN OR  /  MAIN OR    Anesthesia Start: 0704 Anesthesia Stop: 0931    Procedure: RIGHT TOTAL KNEE ARTHROPLASTY (Right: Knee) Diagnosis: (PRIMARY OSTEOARTHRITIS RIGHT KNEE)    Surgeons: Cruz Tran MD Anesthesiologist: Jose Calderón MD    Anesthesia Type: spinal, general ASA Status: 3            Anesthesia Type: spinal, general    Vitals Value Taken Time   /50 07/25/24 0931   Temp 97.5 07/25/24 0931   Pulse 67 07/25/24 0931   Resp 18 07/25/24 0931   SpO2 100 07/25/24 0931       Patient Location: PACU    Anesthesia Type: spinal    Airway Patency: patent    Postop Pain Control: adequate    Mental Status: preanesthetic baseline    Nausea/Vomiting: none    Cardiopulmonary/Hydration status: stable euvolemic    Complications: no apparent anesthesia related complications    Postop vital signs: stable    Dental Exam: Unchanged from Preop    Patient to be discharged from PACU when criteria met.

## 2024-07-26 VITALS
RESPIRATION RATE: 18 BRPM | SYSTOLIC BLOOD PRESSURE: 106 MMHG | DIASTOLIC BLOOD PRESSURE: 60 MMHG | HEART RATE: 62 BPM | HEIGHT: 60 IN | TEMPERATURE: 98 F | BODY MASS INDEX: 24.94 KG/M2 | OXYGEN SATURATION: 97 % | WEIGHT: 127 LBS

## 2024-07-26 LAB
ANION GAP SERPL CALC-SCNC: 4 MMOL/L (ref 0–18)
BASOPHILS # BLD AUTO: 0.01 X10(3) UL (ref 0–0.2)
BASOPHILS # BLD AUTO: 0.01 X10(3) UL (ref 0–0.2)
BASOPHILS NFR BLD AUTO: 0.1 %
BASOPHILS NFR BLD AUTO: 0.1 %
BUN BLD-MCNC: 19 MG/DL (ref 9–23)
CALCIUM BLD-MCNC: 8.7 MG/DL (ref 8.7–10.4)
CHLORIDE SERPL-SCNC: 106 MMOL/L (ref 98–112)
CO2 SERPL-SCNC: 25 MMOL/L (ref 21–32)
CREAT BLD-MCNC: 1.02 MG/DL
EGFRCR SERPLBLD CKD-EPI 2021: 58 ML/MIN/1.73M2 (ref 60–?)
EOSINOPHIL # BLD AUTO: 0 X10(3) UL (ref 0–0.7)
EOSINOPHIL # BLD AUTO: 0.01 X10(3) UL (ref 0–0.7)
EOSINOPHIL NFR BLD AUTO: 0 %
EOSINOPHIL NFR BLD AUTO: 0.1 %
ERYTHROCYTE [DISTWIDTH] IN BLOOD BY AUTOMATED COUNT: 12.2 %
ERYTHROCYTE [DISTWIDTH] IN BLOOD BY AUTOMATED COUNT: 12.5 %
GLUCOSE BLD-MCNC: 125 MG/DL (ref 70–99)
GLUCOSE BLD-MCNC: 130 MG/DL (ref 70–99)
GLUCOSE BLD-MCNC: 136 MG/DL (ref 70–99)
GLUCOSE BLD-MCNC: 139 MG/DL (ref 70–99)
HCT VFR BLD AUTO: 22.9 %
HCT VFR BLD AUTO: 24.1 %
HGB BLD-MCNC: 7.8 G/DL
HGB BLD-MCNC: 8.1 G/DL
IMM GRANULOCYTES # BLD AUTO: 0.04 X10(3) UL (ref 0–1)
IMM GRANULOCYTES # BLD AUTO: 0.05 X10(3) UL (ref 0–1)
IMM GRANULOCYTES NFR BLD: 0.4 %
IMM GRANULOCYTES NFR BLD: 0.5 %
LACTATE SERPL-SCNC: 1.6 MMOL/L (ref 0.5–2)
LYMPHOCYTES # BLD AUTO: 0.68 X10(3) UL (ref 1–4)
LYMPHOCYTES # BLD AUTO: 0.98 X10(3) UL (ref 1–4)
LYMPHOCYTES NFR BLD AUTO: 10.4 %
LYMPHOCYTES NFR BLD AUTO: 6.5 %
MCH RBC QN AUTO: 32.4 PG (ref 26–34)
MCH RBC QN AUTO: 32.7 PG (ref 26–34)
MCHC RBC AUTO-ENTMCNC: 33.6 G/DL (ref 31–37)
MCHC RBC AUTO-ENTMCNC: 34.1 G/DL (ref 31–37)
MCV RBC AUTO: 95 FL
MCV RBC AUTO: 97.2 FL
MONOCYTES # BLD AUTO: 0.66 X10(3) UL (ref 0.1–1)
MONOCYTES # BLD AUTO: 0.69 X10(3) UL (ref 0.1–1)
MONOCYTES NFR BLD AUTO: 6.3 %
MONOCYTES NFR BLD AUTO: 7.3 %
NEUTROPHILS # BLD AUTO: 7.7 X10 (3) UL (ref 1.5–7.7)
NEUTROPHILS # BLD AUTO: 7.7 X10(3) UL (ref 1.5–7.7)
NEUTROPHILS # BLD AUTO: 9.04 X10 (3) UL (ref 1.5–7.7)
NEUTROPHILS # BLD AUTO: 9.04 X10(3) UL (ref 1.5–7.7)
NEUTROPHILS NFR BLD AUTO: 81.6 %
NEUTROPHILS NFR BLD AUTO: 86.7 %
OSMOLALITY SERPL CALC.SUM OF ELEC: 284 MOSM/KG (ref 275–295)
PLATELET # BLD AUTO: 160 10(3)UL (ref 150–450)
PLATELET # BLD AUTO: 170 10(3)UL (ref 150–450)
POTASSIUM SERPL-SCNC: 4.5 MMOL/L (ref 3.5–5.1)
RBC # BLD AUTO: 2.41 X10(6)UL
RBC # BLD AUTO: 2.48 X10(6)UL
SODIUM SERPL-SCNC: 135 MMOL/L (ref 136–145)
WBC # BLD AUTO: 10.4 X10(3) UL (ref 4–11)
WBC # BLD AUTO: 9.4 X10(3) UL (ref 4–11)

## 2024-07-26 PROCEDURE — 82962 GLUCOSE BLOOD TEST: CPT

## 2024-07-26 PROCEDURE — 97116 GAIT TRAINING THERAPY: CPT

## 2024-07-26 PROCEDURE — 85025 COMPLETE CBC W/AUTO DIFF WBC: CPT | Performed by: INTERNAL MEDICINE

## 2024-07-26 PROCEDURE — 83605 ASSAY OF LACTIC ACID: CPT | Performed by: INTERNAL MEDICINE

## 2024-07-26 PROCEDURE — 97165 OT EVAL LOW COMPLEX 30 MIN: CPT

## 2024-07-26 PROCEDURE — 97535 SELF CARE MNGMENT TRAINING: CPT

## 2024-07-26 PROCEDURE — 97530 THERAPEUTIC ACTIVITIES: CPT

## 2024-07-26 PROCEDURE — 80048 BASIC METABOLIC PNL TOTAL CA: CPT | Performed by: INTERNAL MEDICINE

## 2024-07-26 RX ORDER — FERROUS SULFATE 325(65) MG
325 TABLET, DELAYED RELEASE (ENTERIC COATED) ORAL
Qty: 30 TABLET | Refills: 0 | Status: SHIPPED | OUTPATIENT
Start: 2024-07-27

## 2024-07-26 NOTE — PHYSICAL THERAPY NOTE
PHYSICAL THERAPY TREATMENT NOTE - INPATIENT    Room Number: 373/373-A     Session: 1     Number of Visits to Meet Established Goals: 3    Presenting Problem: S/p R TKR 7/25  Co-Morbidities : CVA, HL, HTN    PHYSICAL THERAPY MEDICAL/SOCIAL HISTORY  History related to current admission: Patient is a 74 year old female admitted on 7/25/2024 from home for planned R TKR.       HOME SITUATION  Type of Home: House   Home Layout: Two level  Stairs to Bedroom: 14  Railing: Yes     Lives With: Son;Family (DIL and grandkids)  Drives: No  Patient Owned Equipment: Cane     Prior Level of [place]: Pt typically is independent w/ adl's and uses a cane w/I her home.  Pt goes up and down the flight of stairs to her room one time a day.  Reports that she is home alone at times when family is working or at school, but she manages \"fine\".  Pt st she deals w/ leg spasms at night frequently R>L and has some dizziness on/off.      ASSESSMENT     Patient is currently functioning near baseline with bed mobility, transfers, and gait.    Patient currently does not meet criteria for skilled inpatient physical therapy services, however patient will continue to benefit from QID ambulation with nursing staff.  Pt is discharged from IP PT services.  RN aware to re-order if there is a decline in mobility status.        PLAN  PT Treatment Plan: Bed mobility;Patient education;Gait training;Range of motion;Strengthening;Stair training;Transfer training;Balance training  Rehab Potential : Good  Frequency (Obs): Daily    CURRENT GOALS     Goal #1  Patient is able to demonstrate supine - sit EOB @ level: supervision      Goal #2  Patient is able to demonstrate transfers Sit to/from Stand at assistance level: supervision   Goal #3     Patient is able to ambulate 150 feet with assistive device at assistance level: supervision   Goal #4     Patient will negotiate 14 stairs/one curb w/ assistive device and supervision   Goal #5        Goal #6         Goal Comments: Goals established on 2024 all goals achieved     SUBJECTIVE  \"Yeah I'm fine\"    OBJECTIVE  Precautions: Bed/chair alarm; needed    WEIGHT BEARING RESTRICTION  Weight Bearing Restriction: R lower extremity        R Lower Extremity: Weight Bearing as Tolerated       PAIN ASSESSMENT   Ratin  Location: R knee  Management Techniques: Activity promotion;Repositioning    BALANCE                                                                                                                       Static Sitting: Good  Dynamic Sitting: Fair +           Static Standing: Poor +  Dynamic Standing: Poor +    ACTIVITY TOLERANCE                         O2 WALK         AM-PAC '6-Clicks' INPATIENT SHORT FORM - BASIC MOBILITY  How much difficulty does the patient currently have...  Patient Difficulty: Turning over in bed (including adjusting bedclothes, sheets and blankets)?: None   Patient Difficulty: Sitting down on and standing up from a chair with arms (e.g., wheelchair, bedside commode, etc.): None   Patient Difficulty: Moving from lying on back to sitting on the side of the bed?: None   How much help from another person does the patient currently need...   Help from Another: Moving to and from a bed to a chair (including a wheelchair)?: A Little   Help from Another: Need to walk in hospital room?: A Little   Help from Another: Climbing 3-5 steps with a railing?: A Little       AM-PAC Score:  Raw Score: 21   Approx Degree of Impairment: 28.97%   Standardized Score (AM-PAC Scale): 50.25   CMS Modifier (G-Code): CJ    FUNCTIONAL ABILITY STATUS  Gait Assessment   Functional Mobility/Gait Assessment  Gait Assistance: Supervision  Distance (ft): 175  Assistive Device: Rolling walker  Pattern: R Decreased stance time;Comment (Step to gait pattern)  Stairs: Stairs  How Many Stairs: 4  Device: 1 Rail  Assist: Supervision  Pattern: Ascend and Descend  Ascend and Descend : Step  to    Skilled Therapy Provided    Bed Mobility:  Rolling: NT   Supine<>Sit: SBA   Sit<>Supine: NT     Transfer Mobility:  Sit<>Stand: SBA   Stand<>Sit: SBA   Gait: SBA with RW - encouraged step-through, but prefers step-to pattern    Therapist's Comments: writer utilized iPad intrepreter      THERAPEUTIC EXERCISES  Lower Extremity Alternating marching  Ankle pumps  Hip AB/AD  Heel raises  Heel slides  Knee extension     Upper Extremity  - open/close     Position Sitting     Repetitions   10   Sets   12     Patient End of Session: Up in chair;Needs met;Call light within reach;RN aware of session/findings;All patient questions and concerns addressed;Alarm set    PT Session Time: 25 minutes  Gait Training: 15 minutes  Therapeutic Activity: 8 minutes  Therapeutic Exercise: 0 minutes   Neuromuscular Re-education: 0 minutes

## 2024-07-26 NOTE — CM/SW NOTE
07/26/24 0800   CM/SW Referral Data   Referral Source Social Work (self-referral)   Reason for Referral Discharge planning   Informant EMR;Clinical Staff Member   Discharge Needs   Anticipated D/C needs Home health care       HOME SITUATION per PT eval  Type of Home: House   Home Layout: Two level  Stairs to Bedroom: 14  Railing: Yes     Lives With: Son;Family (DIL and grandkids)  Drives: No  Patient Owned Equipment: Cane     Prior Level of [place] per PT eval: Pt typically is independent w/ adl's and uses a cane w/I her home.  Pt goes up and down the flight of stairs to her room one time a day.  Reports that she is home alone at times when family is working or at school, but she manages \"fine\".  Pt st she deals w/ leg spasms at night frequently R>L and has some dizziness on/off.      Patient is a 73 y/o woman admitted s/p TKR.  Pt with pre-operative plan for [facility] [place].  PT recommending [facility] services at discharge.  Maxine from [facility] confirmed pt accepted for [facility] services at [place].  No other [place] needs/concerns identified at this time.  / to remain available for support and/or discharge planning.     Katherin Coreas, [facility]  Discharge Planner  [phone]

## 2024-07-26 NOTE — PROGRESS NOTES
[facility]   part of [facility] Hospitalist Progress Note     Erlinda Knight Patient Status:  Outpatient in a Bed    1950 MRN [id number]   Location [facility] 3SW-A Attending Cruz Tran MD   Hosp Day # 0 PCP DARRELL Ch     Follow Up:  The encounter diagnosis was Pre-op testing.    Subjective:     Patient seen and examined.   Interview via [employer] video .  She c/o some R knee pain but improved from yesterday.  No F/C, N/V.      Objective:    Review of Systems:   10 point ROS completed and was negative, except for pertinent positive and negatives stated in subjective.    Vital signs:  Temp:  [97.4 °F (36.3 °C)-98 °F (36.7 °C)] 97.6 °F (36.4 °C)  Pulse:  [51-57] 53  Resp:  [16-18] 18  BP: ()/(49-64) 93/51  SpO2:  [95 %-100 %] 100 %    Physical Exam:    General: No acute distress.   HEENT:  EOMI, PERRLA, OP clear  Respiratory: Clear to auscultation bilaterally. No wheezes. No rhonchi.  Cardiovascular: S1, S2. Regular rate and rhythm. No murmurs.  Abdomen: Soft, nontender, nondistended.  Positive bowel sounds. No rebound or guarding.  R knee in brace.   Extremities: No edema.  Neuro:  Grossly non focal, no motor deficits.        Diagnostic Data:    Labs:  Recent Labs   Lab 24  0158 24  0539   WBC 10.4 9.4   HGB 8.1* 7.8*   MCV 97.2 95.0   .0 160.0       Recent Labs   Lab 24  0158   *   BUN 19   CREATSERUM 1.02   CA 8.7   *   K 4.5      CO2 25.0       Estimated Creatinine Clearance: 34.8 mL/min (based on SCr of 1.02 mg/dL).    No results for input(s): \"PTP\", \"INR\" in the last 168 hours.         COVID-19 Lab Results    COVID-19  No results found for: \"COVID19\"    Pro-Calcitonin  No results for input(s): \"PCT\" in the last 168 hours.    Cardiac  No results for input(s): \"TROP\", \"PBNP\" in the last 168 hours.    Creatinine Kinase  No results for input(s): \"CK\" in the last 168 hours.    Inflammatory  Markers  No results for input(s): \"CRP\", \"IVAN\", \"LDH\", \"DDIMER\" in the last 168 hours.    Imaging: Imaging data reviewed in Epic.    Medications:    sennosides  17.2 mg Oral Nightly    docusate sodium  100 mg Oral BID    famotidine  20 mg Oral BID    Or    famotidine  20 mg Intravenous BID    ferrous sulfate  325 mg Oral Daily with breakfast    aspirin  81 mg Oral BID    ketorolac  15 mg Intravenous Q6H    traMADol  50 mg Oral 4 times per day    insulin aspart  1-5 Units Subcutaneous TID AC and HS    sacubitril-valsartan  1 tablet Oral BID    gabapentin  300 mg Oral Q12H    levothyroxine  75 mcg Oral Before breakfast       Assessment & Plan:      74 year old female with PMH sig for DM, HTN, HL, stroke, hypothyroid here sp R TKA      Sp R TKA  HTN  DM  hypothyroidism  HL  Hx stroke 2015     Sp R TKA  - post op per ortho  - PT, OT evals  - DVT ppx -asa per ortho  - CBC in am expected abla  - pain control IV > po as able     Acute blood loss anemia   - due to surgery  - hgb stable post op at 7.8  - monitor     DM II with hyperglycemia   - hold metformin  - SSI PRN  - hypoglycemic protocol     Hypothyroidism  - resumed home synthroid     Stroke hx, HTN, HL  - cont home BP meds as tolerated  - statin  - okay to resume plavix per ortho on discharge     Plan of care: pending ortho clearance     Plan of care discussed with patient or family at bedside.    Júnior Chung, DO    Supplementary Documentation:     Quality:  DVT Prophylaxis:   CODE status: ASA  [place]: no  Central line: no  If COVID testing is negative, may discontinue isolation: yes     Estimated date of discharge: likely today   Discharge is dependent on: ortho clearance   At this point Ms. Knight is expected to be discharge to: home     Plan of care discussed with pt

## 2024-07-26 NOTE — PLAN OF CARE
Alert and oriented x 4. VSS. Pain controlled with scheduled medications. . R knee with aquacel dressing c/d/I.. Voiding without difficulty. Last BM 7/24 Tolerating diet. Ambulating with 1 person assist and the walker. Plan is for PT tomorrow, IV abx, and pain control, then discharge home with son. Plan of care discussed with patient and patients son at the bedside.

## 2024-07-26 NOTE — PLAN OF CARE
Discharge instructions reviewed with patient.  services utilized. All questions answered. IV removed. Bedside belongings packed up and returned to patient. Son to arrive at 1830.

## 2024-07-26 NOTE — PLAN OF CARE
Pt A&Ox4. VSS on RA. Georgian speaking,  services utilized. .  Dressing to right anterior knee intact, gel ice applied. Pain well controlled on scheduled medications. Plan for PT/OT to see. Home with HH when cleared. Call light within reach. Will continue to monitor.

## 2024-07-26 NOTE — OCCUPATIONAL THERAPY NOTE
OCCUPATIONAL THERAPY EVALUATION - INPATIENT    Room Number: 373/373-A  Evaluation Date: 7/26/2024     Type of Evaluation: Initial  Presenting Problem: s/p R TKA on 7/25    Physician Order: IP Consult to Occupational Therapy  Reason for Therapy:  ADL/IADL Dysfunction and Discharge Planning      OCCUPATIONAL THERAPY ASSESSMENT   Patient is a 74 year old female admitted on 7/25/2024 with Presenting Problem: s/p R TKA on 7/25. Co-Morbidities : CVA, HL, HTN  Patient is currently functioning near baseline with toileting, bathing, upper body dressing, lower body dressing, bed mobility, and transfers.  Prior to admission, patient's baseline is mod I with the use of a cane.  Patient met all OT goals at supervision level.  Patient reports no further questions/concerns at this time.     Patient will benefit from continued skilled OT Services at discharge to promote prior level of function.  Anticipate patient will return home with home health OT      WEIGHT BEARING RESTRICTION  Weight Bearing Restriction: R lower extremity        R Lower Extremity: Weight Bearing as Tolerated       Recommendations for nursing staff:   Transfers: supervision with RW  Toileting location: Toilet    EVALUATION SESSION:  Patient at start of session: Pt was found sitting in her chair.       FUNCTIONAL TRANSFER ASSESSMENT  Sit to Stand: Chair  Chair: Supervision    BED MOBILITY  Sit to Supine (OT): Contact Guard Assist    BALANCE ASSESSMENT     FUNCTIONAL ADL ASSESSMENT  UB Dressing Seated: Supervision  LB Dressing Seated: Supervision  LB Dressing Standing: Supervision  Toileting Seated: Supervision  Toileting Standing: Supervision      ACTIVITY TOLERANCE:                          O2 SATURATIONS       COGNITION  Safety Judgement:  decreased awareness of need for safety  COGNITION ASSESSMENTS       Upper Extremity:   ROM: within functional limits   Strength: is within functional limits       EDUCATION PROVIDED  Patient: Role of Occupational Therapy;  Plan of Care; Discharge Recommendations; Functional Transfer Techniques; Fall Prevention; Compensatory ADL Techniques  Patient's Response to Education: Verbalized Understanding    Equipment used: RW, gait belt   Demonstrates functional use    Therapist comments: sitting in chair>UB/LB dressing>stand to RW>walk to sit EOB>supine>sit EOB>stand to RW>steps to sit in chair     Patient End of Session: In bed;Needs met;Call light within reach;RN aware of session/findings;All patient questions and concerns addressed;SCDs in place;Ice applied;Alarm set    OCCUPATIONAL PROFILE    HOME SITUATION  Type of Home: House  Home Layout: Two level  Lives With: Son;Family (DIL and grandkids)    Toilet and Equipment: Comfort height toilet  Shower/Tub and Equipment: Tub-shower combo  Other Equipment: Other (Comment) (cane)    Occupation/Status: N/A  Hand Dominance: Right  Drives: No  Patient Regularly Uses: Glasses    Prior Level of Function: Pt lives at home with her son and his family. Pt uses a cane at baseline.     SUBJECTIVE  \"When I was here for my stroke they let me walk around the halls in my gown.\"     PAIN ASSESSMENT  Rating: Unable to rate  Location: R LE  Management Techniques: Ice;Relaxation;Repositioning    OBJECTIVE  Precautions: Bed/chair alarm; needed  Fall Risk: High fall risk    WEIGHT BEARING RESTRICTION  Weight Bearing Restriction: R lower extremity        R Lower Extremity: Weight Bearing as Tolerated       AM-PAC ‘6-Clicks’ Inpatient Daily Activity Short Form  -   Putting on and taking off regular lower body clothing?: A Little  -   Bathing (including washing, rinsing, drying)?: A Little  -   Toileting, which includes using toilet, bedpan or urinal? : A Little  -   Putting on and taking off regular upper body clothing?: None  -   Taking care of personal grooming such as brushing teeth?: A Little  -   Eating meals?: A Little    AM-PAC Score:  Score: 19  Approx Degree of Impairment: 42.8%  Standardized  Score (AM-PAC Scale): 40.22      ADDITIONAL TESTS     NEUROLOGICAL FINDINGS        PLAN   Patient has been evaluated and presents with no skilled Occupational Therapy needs at this time.  Patient discharged from Occupational Therapy services.  Please re-order if a new functional limitation presents during this admission.      Patient Evaluation Complexity Level:   Occupational Profile/Medical History LOW - Brief history including review of medical or therapy records    Specific performance deficits impacting engagement in ADL/IADL LOW  1 - 3 performance deficits    Client Assessment/Performance Deficits LOW - No comorbidities nor modifications of tasks    Clinical Decision Making LOW - Analysis of occupational profile, problem-focused assessments, limited treatment options    Overall Complexity LOW     OT Session Time: 30 minutes  Self-Care Home Management: 15 minutes

## 2024-07-26 NOTE — PROGRESS NOTES
Progress Note    Patient: Erlinda Knight  Medical record #: [id number]    Erlinda Knight is a 74 year old  female who is POD #1 Right TKA.  The patient's main complaint today is surgical pain at the operative site. No numbness or tingling. No chest pain or shortness of breath.    Hospital Problem List:  Active Problems:    * No active hospital problems. *      Current Medications:   [COMPLETED] sodium chloride 0.9 % IV bolus 500 mL  500 mL Intravenous Once    [COMPLETED] sodium chloride 0.9 % IV bolus 500 mL  500 mL Intravenous Once    [COMPLETED] sodium chloride 0.9 % IV bolus 1,000 mL  1,000 mL Intravenous Once    lactated ringers infusion   Intravenous Continuous    sodium chloride 0.9 % IV bolus 500 mL  500 mL Intravenous Once PRN    sennosides (Senokot) tab 17.2 mg  17.2 mg Oral Nightly    docusate sodium (Colace) cap 100 mg  100 mg Oral BID    polyethylene glycol (PEG 3350) (Miralax) 17 g oral packet 17 g  17 g Oral Daily PRN    magnesium hydroxide (Milk of Magnesia) 400 MG/5ML oral suspension 30 mL  30 mL Oral Daily PRN    bisacodyl (Dulcolax) 10 MG rectal suppository 10 mg  10 mg Rectal Daily PRN    fleet enema (Fleet) 7-19 GM/118ML rectal enema 133 mL  1 enema Rectal Once PRN    famotidine (Pepcid) tab 20 mg  20 mg Oral BID    Or    famotidine (Pepcid) 20 mg/2mL injection 20 mg  20 mg Intravenous BID    ondansetron (Zofran) 4 MG/2ML injection 4 mg  4 mg Intravenous Q6H PRN    metoclopramide (Reglan) 5 mg/mL injection 10 mg  10 mg Intravenous Q8H PRN    [] diphenhydrAMINE (Benadryl) 50 mg/mL  injection 25 mg  25 mg Intravenous Once PRN    diphenhydrAMINE (Benadryl) cap/tab 25 mg  25 mg Oral Q4H PRN    Or    diphenhydrAMINE (Benadryl) 50 mg/mL  injection 12.5 mg  12.5 mg Intravenous Q4H PRN    ferrous sulfate DR tab 325 mg  325 mg Oral Daily with breakfast    aspirin DR tab 81 mg  81 mg Oral BID    [COMPLETED] ceFAZolin (Ancef) 2g in 10mL IV syringe premix  2 g Intravenous Q8H    tiZANidine  (Zanaflex) partial tab 1 mg  1 mg Oral Q6H PRN    [COMPLETED] dexAMETHasone PF (Decadron) 10 MG/ML injection 8 mg  8 mg Intravenous Once    ketorolac (Toradol) 15 MG/ML injection 15 mg  15 mg Intravenous Q6H    traMADol (Ultram) tab 50 mg  50 mg Oral 4 times per day    [COMPLETED] acetaminophen (Tylenol) tab 650 mg  650 mg Oral Once    insulin aspart (NovoLOG) 100 Units/mL FlexPen 1-5 Units  1-5 Units Subcutaneous TID AC and HS    sacubitril-valsartan (Entresto) 24-26 MG per tab 1 tablet  1 tablet Oral BID    gabapentin (Neurontin) cap 300 mg  300 mg Oral Q12H    levothyroxine (Synthroid) tab 75 mcg  75 mcg Oral Before breakfast    [COMPLETED] povidone-iodine (Betadine) 10 % 17.5 mL in sodium chloride 0.9 % 500 mL irrigation   Irrigation Once (Intra-Op)    [COMPLETED] clonidine/epinephrine/ropivacaine/ketorolac in 0.9% NaCl 60 mL pain cocktail syringe for knee arthroplasty   Infiltration Once (Intra-Op)         Input/Output:    Intake/Output Summary (Last 24 hours) at 7/26/2024 0844  Last data filed at 7/25/2024 1556  Gross per 24 hour   Intake 1480 ml   Output 350 ml   Net 1130 ml       Vital signs:  Blood pressure 93/51, pulse 53, temperature 97.6 °F (36.4 °C), temperature source Oral, resp. rate 18, height 5' (1.524 m), weight 127 lb (57.6 kg), SpO2 100%.    Physical Exam:     Right Knee:  Dressing: clean and dry  Able to dorsiflex ankle and move toes  Sensation grossly intact to light touch throughout  Distal pulses intact  No calf swelling  Zafar's sign negative  Compartments soft  No signs or symptoms of DVT or compartment syndrome    Labs:   Lab Results   Component Value Date    WBC 9.4 07/26/2024    HGB 7.8 07/26/2024    HCT 22.9 07/26/2024    .0 07/26/2024    CREATSERUM 1.02 07/26/2024    BUN 19 07/26/2024     07/26/2024    K 4.5 07/26/2024     07/26/2024    CO2 25.0 07/26/2024     07/26/2024    CA 8.7 07/26/2024         Assessment: 74 year old  female POD #1 Right  TKA    Plan:  Hgb - 7.8 - acute blood loss anemia, consistent with post op changes, stable/asymptomatic  mobilize with PT, WBAT on operative knee with walker   [place]-op Glucose Goal control <140  pain controlled   Ice and elevation  TEDs, SCDS, IS  ASA 81 mg BID for 6 weeks according to CHEST guidelines and bleeding risks.   Disposition: plan discharge home once cleared medically and if doing well with PT; if slow to progress consider transfer to rehab   Leave Dressing in place for one week once aquacel in place.  OK to switch dressing for >50% saturated.  Ok to shower with it on.    Follow up with Dr. Tran - 2-3 weeks after discharge home or 2-3 weeks after discharge from rehab  Appreciate medical team's expertise and help in managing the patient's medical co-morbidities    Followed by [facility] for medical conditions to include Active Problems:    * No active hospital problems. *        Signed:  ELEAZAR Dickinson  7/26/2024  8:44 AM

## (undated) DEVICE — SHEET,DRAPE,70X100,STERILE: Brand: MEDLINE

## (undated) DEVICE — APPLICATOR PREP 26ML CHG 2% ISO ALC 70%

## (undated) DEVICE — WRAP COMPR UNIV KNEE HOT CLD GEL MICWV AND

## (undated) DEVICE — HOOD, PEEL-AWAY: Brand: FLYTE

## (undated) DEVICE — SUT COAT VCRL 0 27IN CT-1 ABSRB UD 36MM 1/2

## (undated) DEVICE — SPONGE GZ 4X4IN COT 12 PLY TYP VII WVN C

## (undated) DEVICE — CONTAINER,SPECIMEN,PNEU TUBE,4OZ,OR STRL: Brand: MEDLINE

## (undated) DEVICE — VIOLET BRAIDED (POLYGLACTIN 910), SYNTHETIC ABSORBABLE SUTURE: Brand: COATED VICRYL

## (undated) DEVICE — DRAPE,U/SHT,SPLIT,FILM,60X84,STERILE: Brand: MEDLINE

## (undated) DEVICE — GLOVE SUR 6.5 SENSICARE PI PIP GRN PWD F

## (undated) DEVICE — SCREW ORTH 2.5X25MM FEM HEX PERSONA

## (undated) DEVICE — HOOD: Brand: FLYTE

## (undated) DEVICE — PENCIL ES BTTN SWCH W/ TIP HOLSTER E-Z CLN

## (undated) DEVICE — SUT STRATAFIX SPRL MCRYL+ 2-0 27IN CT-1 ABSRB

## (undated) DEVICE — BANDAGE COHESIVE 4INX5YD TAN E POR SLF ADH

## (undated) DEVICE — ZZ- DISC- NOSUB-SOLUTION RUBBING 4OZ 70% ISO ALC CLR

## (undated) DEVICE — TOTAL KNEE CDS: Brand: MEDLINE INDUSTRIES, INC.

## (undated) DEVICE — GLOVE SUR 7 SENSICARE PI PIP CRM PWD F

## (undated) DEVICE — STOCKINETTE,IMPERVIOUS,12X48,STERILE: Brand: MEDLINE

## (undated) DEVICE — SYRINGE MED 30ML STD CLR PLAS LL TIP N CTRL

## (undated) DEVICE — GLOVE SUR 7.5 SENSICARE PI PIP GRN PWD F

## (undated) DEVICE — PIN DRL 75MM HDLSS TRCR NXGN

## (undated) DEVICE — 2T11 #2 PDO 36 X 36: Brand: 2T11 #2 PDO 36 X 36

## (undated) DEVICE — BLADE RMR 29MM PAT SS W/ PILOT H

## (undated) DEVICE — SYSTEM VAC MIX SGL DBL CLEARMIX 10 PER CA

## (undated) DEVICE — SOLUTION IRRIG 3000ML 0.9% NACL FLX CONT

## (undated) DEVICE — NEEDLE SPNL 18GA L3.5IN PNK QNCKE STYL DISP

## (undated) DEVICE — SLEEVE COMPR MD KNEE LEN SGL USE KENDALL SCD

## (undated) DEVICE — BIPOLAR SEALER 23-112-1 AQM 6.0: Brand: AQUAMANTYS™

## (undated) DEVICE — SUT MCRYL 3-0 27IN ABSRB UD 19MM PS-2 3/8

## (undated) DEVICE — MEDI-VAC NON-CONDUCTIVE SUCTION TUBING: Brand: CARDINAL HEALTH

## (undated) DEVICE — ADHESIVE SKIN TOP FOR WND CLSR DERMBND ADV

## (undated) DEVICE — GLOVE SUR 6.5 SENSICARE PI PIP CRM PWD F

## (undated) DEVICE — Device: Brand: STABLECUT®

## (undated) DEVICE — COVER LT HNDL RIG FOR SUR CAM DISP

## (undated) DEVICE — UNIVERSAL STERIBUMP® STERILE (5/CASE): Brand: UNIVERSAL STERIBUMP®